# Patient Record
Sex: MALE | Race: WHITE | Employment: OTHER | URBAN - METROPOLITAN AREA
[De-identification: names, ages, dates, MRNs, and addresses within clinical notes are randomized per-mention and may not be internally consistent; named-entity substitution may affect disease eponyms.]

---

## 2017-02-20 ENCOUNTER — ONCOLOGY VISIT (OUTPATIENT)
Dept: ONCOLOGY | Facility: CLINIC | Age: 72
End: 2017-02-20
Payer: MEDICARE

## 2017-02-20 VITALS
DIASTOLIC BLOOD PRESSURE: 73 MMHG | TEMPERATURE: 97.3 F | SYSTOLIC BLOOD PRESSURE: 124 MMHG | HEIGHT: 73 IN | BODY MASS INDEX: 30.75 KG/M2 | WEIGHT: 232 LBS | RESPIRATION RATE: 20 BRPM | HEART RATE: 64 BPM | OXYGEN SATURATION: 96 %

## 2017-02-20 DIAGNOSIS — C01 MALIGNANT NEOPLASM OF BASE OF TONGUE (H): Primary | ICD-10-CM

## 2017-02-20 DIAGNOSIS — R63.5 ABNORMAL WEIGHT GAIN: ICD-10-CM

## 2017-02-20 DIAGNOSIS — C02.9 SQUAMOUS CELL CANCER OF TONGUE (H): ICD-10-CM

## 2017-02-20 DIAGNOSIS — E03.2 HYPOTHYROIDISM DUE TO NON-MEDICATION EXOGENOUS SUBSTANCES: ICD-10-CM

## 2017-02-20 DIAGNOSIS — T66.XXXS: ICD-10-CM

## 2017-02-20 LAB
ALBUMIN SERPL-MCNC: 3.8 G/DL (ref 3.4–5)
ALP SERPL-CCNC: 76 U/L (ref 40–150)
ALT SERPL W P-5'-P-CCNC: 32 U/L (ref 0–70)
ANION GAP SERPL CALCULATED.3IONS-SCNC: 7 MMOL/L (ref 3–14)
AST SERPL W P-5'-P-CCNC: 17 U/L (ref 0–45)
BASOPHILS # BLD AUTO: 0 10E9/L (ref 0–0.2)
BASOPHILS NFR BLD AUTO: 0.3 %
BILIRUB SERPL-MCNC: 1 MG/DL (ref 0.2–1.3)
BUN SERPL-MCNC: 18 MG/DL (ref 7–30)
CALCIUM SERPL-MCNC: 9.2 MG/DL (ref 8.5–10.1)
CHLORIDE SERPL-SCNC: 108 MMOL/L (ref 94–109)
CO2 SERPL-SCNC: 25 MMOL/L (ref 20–32)
CREAT SERPL-MCNC: 1.02 MG/DL (ref 0.66–1.25)
DIFFERENTIAL METHOD BLD: NORMAL
EOSINOPHIL # BLD AUTO: 0.1 10E9/L (ref 0–0.7)
EOSINOPHIL NFR BLD AUTO: 1.5 %
ERYTHROCYTE [DISTWIDTH] IN BLOOD BY AUTOMATED COUNT: 12.9 % (ref 10–15)
GFR SERPL CREATININE-BSD FRML MDRD: 72 ML/MIN/1.7M2
GLUCOSE SERPL-MCNC: 96 MG/DL (ref 70–99)
HCT VFR BLD AUTO: 43 % (ref 40–53)
HGB BLD-MCNC: 15.3 G/DL (ref 13.3–17.7)
LYMPHOCYTES # BLD AUTO: 1.5 10E9/L (ref 0.8–5.3)
LYMPHOCYTES NFR BLD AUTO: 19.6 %
MCH RBC QN AUTO: 31.2 PG (ref 26.5–33)
MCHC RBC AUTO-ENTMCNC: 35.6 G/DL (ref 31.5–36.5)
MCV RBC AUTO: 88 FL (ref 78–100)
MONOCYTES # BLD AUTO: 0.6 10E9/L (ref 0–1.3)
MONOCYTES NFR BLD AUTO: 8 %
NEUTROPHILS # BLD AUTO: 5.6 10E9/L (ref 1.6–8.3)
NEUTROPHILS NFR BLD AUTO: 70.6 %
PLATELET # BLD AUTO: 235 10E9/L (ref 150–450)
POTASSIUM SERPL-SCNC: 4.4 MMOL/L (ref 3.4–5.3)
PROT SERPL-MCNC: 7.1 G/DL (ref 6.8–8.8)
RBC # BLD AUTO: 4.9 10E12/L (ref 4.4–5.9)
SODIUM SERPL-SCNC: 140 MMOL/L (ref 133–144)
TSH SERPL DL<=0.05 MIU/L-ACNC: 2.58 MU/L (ref 0.4–4)
WBC # BLD AUTO: 7.9 10E9/L (ref 4–11)

## 2017-02-20 PROCEDURE — 99213 OFFICE O/P EST LOW 20 MIN: CPT | Performed by: INTERNAL MEDICINE

## 2017-02-20 PROCEDURE — 84443 ASSAY THYROID STIM HORMONE: CPT | Performed by: INTERNAL MEDICINE

## 2017-02-20 PROCEDURE — 36415 COLL VENOUS BLD VENIPUNCTURE: CPT | Performed by: INTERNAL MEDICINE

## 2017-02-20 PROCEDURE — 80053 COMPREHEN METABOLIC PANEL: CPT | Performed by: INTERNAL MEDICINE

## 2017-02-20 PROCEDURE — 85025 COMPLETE CBC W/AUTO DIFF WBC: CPT | Performed by: INTERNAL MEDICINE

## 2017-02-20 ASSESSMENT — PAIN SCALES - GENERAL: PAINLEVEL: NO PAIN (0)

## 2017-02-20 NOTE — MR AVS SNAPSHOT
After Visit Summary   2/20/2017    Paulie Mcdonough    MRN: [id number]           Patient Information     Date Of Birth          1945        Visit Information        Provider Department      2/20/2017 3:00 PM Jovi Mueller MD [facility]        Today's Diagnoses     MALIG NEOPLASM TONGUE BASE    -  1    Hypothyroidism due to non-medication exogenous substances           Follow-ups after your visit        Your next 10 appointments already scheduled     Apr 03, 2017 11:45 AM CDT   (Arrive by 11:30 AM)   RETURN TUMOR VISIT with Almas Jeff MD   [facility] ([facility])    [address]  4th Floor  [facility] [id number] [phone]            Jun 14, 2017  9:30 AM CDT   LAB with NL LAB [facility] ([facility])    [address] [place] [id number] [phone]           Patient must bring picture ID.  Patient should be prepared to give a urine specimen  Please do not eat 10-12 hours before your appointment if you are coming in fasting for labs on lipids, cholesterol, or glucose (sugar).  Pregnant women should follow their Care Team instructions. Water with medications is okay. Do not drink coffee or other fluids.   If you have concerns about taking  your medications, please ask at office or if scheduling via [employer], send a message by clicking on Secure Messaging, Message Your Care Team.            Aug 14, 2017 10:00 AM CDT   CT SOFT TISSUE NECK W CONTRAST with PHCT1   [facility] CT Scan ([facility])    [address] [place] [id number] [phone]           Please bring any scans or X-rays taken at other hospitals, if similar tests were done. Also bring a list of your medicines, including vitamins, minerals and over-the-counter drugs. It is safest to leave personal items at home.  Be sure to tell your doctor:   If you have any  allergies.   If there s any chance you are pregnant.   If you are breastfeeding.   If you have any special needs.  You will have contrast for this exam. To prepare:   Do not eat or drink for 2 hours before your exam. If you need to take medicine, you may take it with small sips of water. (We may ask you to take liquid medicine as well.)   The day before your exam, drink extra fluids at least six 8-ounce glasses (unless your doctor tells you to restrict your fluids).  Patients over 70 or patients with diabetes or kidney problems:   If you haven t had a blood test (creatinine test) within the last 30 days, go to your clinic or Diagnostic Imaging Department for this test.  If you have diabetes:   If your kidney function is normal, continue taking your metformin (Avandamet, Glucophage, Glucovance, Metaglip) on the day of your exam.   If your kidney function is abnormal, wait 48 hours before restarting this medicine.  Please wear loose clothing, such as a sweat suit or jogging clothes. Avoid snaps, zippers and other metal. We may ask you to undress and put on a hospital gown.  If you have any questions, please call the Imaging Department where you will have your exam.            Aug 14, 2017 10:30 AM CDT   CT CHEST W CONTRAST with PHCT1   [facility] CT Scan ([facility])    [address] [place] [id number] [phone]           Please bring any scans or X-rays taken at other hospitals, if similar tests were done. Also bring a list of your medicines, including vitamins, minerals and over-the-counter drugs. It is safest to leave personal items at home.  Be sure to tell your doctor:   If you have any allergies.   If there s any chance you are pregnant.   If you are breastfeeding.   If you have any special needs.  You will have contrast for this exam. To prepare:   Do not eat or drink for 2 hours before your exam. If you need to take medicine, you may take it with small sips of water.  (We may ask you to take liquid medicine as well.)   The day before your exam, drink extra fluids at least six 8-ounce glasses (unless your doctor tells you to restrict your fluids).  Patients over 70 or patients with diabetes or kidney problems:   If you haven t had a blood test (creatinine test) within the last 30 days, go to your clinic or Diagnostic Imaging Department for this test.  If you have diabetes:   If your kidney function is normal, continue taking your metformin (Avandamet, Glucophage, Glucovance, Metaglip) on the day of your exam.   If your kidney function is abnormal, wait 48 hours before restarting this medicine.  Please wear loose clothing, such as a sweat suit or jogging clothes. Avoid snaps, zippers and other metal. We may ask you to undress and put on a hospital gown.  If you have any questions, please call the Imaging Department where you will have your exam.            Aug 21, 2017  1:00 PM CDT   Return Visit with Jovi Mueller MD   [facility] ([facility])    [address] [place] [id number] [phone]              Future tests that were ordered for you today     Open Future Orders        Priority Expected Expires Ordered    CBC with platelets differential Routine 7/20/2017 2/20/2018 2/20/2017    Comprehensive metabolic panel Routine 7/20/2017 2/20/2018 2/20/2017    CT Chest w Contrast Routine 7/20/2017 2/20/2018 2/20/2017    TSH with free T4 reflex Routine 7/20/2017 2/20/2018 2/20/2017    CT Soft Tissue Neck w Contrast Routine 7/20/2017 2/20/2018 2/20/2017            Who to contact     If you have questions or need follow up information about today's clinic visit or your schedule please contact [facility] directly at [phone].  Normal or non-critical lab and imaging results will be communicated to you by MyChart, letter or phone within 4 business days after the clinic has received the results. If you do not hear  "from us within 7 days, please contact the clinic through [employer] or phone. If you have a critical or abnormal lab result, we will notify you by phone as soon as possible.  Submit refill requests through [employer] or call your pharmacy and they will forward the refill request to us. Please allow 3 business days for your refill to be completed.          Additional Information About Your Visit        [employer] Information     [employer] gives you secure access to your electronic health record. If you see a primary care provider, you can also send messages to your care team and make appointments. If you have questions, please call your primary care clinic.  If you do not have a primary care provider, please call [phone] and they will assist you.      [employer] is an electronic gateway that provides easy, online access to your medical records. With [employer], you can request a clinic appointment, read your test results, renew a prescription or communicate with your care team.     To access your existing account, please contact your [facility] or call [phone] for assistance.        Care EveryWhere ID     This is your Care EveryWhere ID. This could be used by other organizations to access your [facility] medical records  [id number]        Your Vitals Were     Pulse Temperature Respirations Height Pulse Oximetry BMI (Body Mass Index)    64 97.3  F (36.3  C) (Oral) 20 1.854 m (6' 0.99\") 96% 30.62 kg/m2       Blood Pressure from Last 3 Encounters:   02/20/17 124/73   08/15/16 150/84   02/08/16 140/85    Weight from Last 3 Encounters:   02/20/17 105.2 kg (232 lb)   08/15/16 106.6 kg (235 lb)   03/14/16 107.2 kg (236 lb 4.8 oz)               Primary Care Provider    [facility]       No address on file        Thank you!     Thank you for choosing [facility]  for your care. Our goal is always to provide you with excellent care. Hearing back from our patients " is one way we can continue to improve our services. Please take a few minutes to complete the written survey that you may receive in the mail after your visit with us. Thank you!             Your Updated Medication List - Protect others around you: Learn how to safely use, store and throw away your medicines at www.disposemymeds.org.          This list is accurate as of: 2/20/17  4:13 PM.  Always use your most recent med list.                   Brand Name Dispense Instructions for use    DOXAZOSIN MESYLATE PO      Take 2 mg by mouth daily       finasteride 5 MG tablet    PROSCAR     Take 5 mg by mouth       FLUZONE HIGH-DOSE 0.5 ML injection   Generic drug:  influenza Vac Split High-Dose          levothyroxine 50 MCG tablet    SYNTHROID/LEVOTHROID    90 tablet    Take 1 tablet (50 mcg) by mouth daily       loratadine 10 MG tablet    CLARITIN     Take 10 mg by mouth daily       OMEPRAZOLE PO      Take 20 mg by mouth daily

## 2017-02-20 NOTE — PROGRESS NOTES
[facility] CANCER CLINIC  FOLLOW-UP VISIT NOTE  Date of visit: Feb 20, 2017       Reason for Visit: follow-up SCC BOT, completed concurrent chemoradiation therapy with HD cisplatin 8/2013    Chemotherapy 6/24/2013 7/15/2013 8/5/2013   Day, Cycle Day 1, Cycle 1 Day 22, Cycle 1 Day 43, Cycle 1   CISplatin (PLATINOL)  mg 230 mg 230 mg     HPI: Mr. 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 He began having a sore throat, increasing expectoration, and eventually started having blood in his sputum. He started having coughing spells, and had a particularly bad episode on Memorial Day weekend. He presented to the Emergency Department where he was evaluated with imaging and was notified that he might have cancer. He was referred to an ear, nose and throat specialist locally in [place]. His ENT surgeon in [place] did confirm that he did have cancer, and referred him to the [facility] ER where he was seen by Dr. Hernandez on 06/06. At this visit, he did express shortness of breath, which was progressively getting worse, and difficulty managing his secretions, especially during the night. He used to keep the head end of the bed elevated and had some noisy breathing. Dr. Hernandez did perform a flexible fiberoptic direct laryngoscopy where a large mass occupying greater than 50% of the oropharyngeal airway was noted, which seemed to be arising from the base of the tongue. It did fill the vallecula and extended along the lateral pharyngeal wall into the hypopharynx and supraglottis. Biopsy of right tongue base revealed poorly differentiated squamous cell carcinoma. The mass seemed to be obstructing the airway, and, although the patient was not in any immediate airway distress, it was recommended that he undergo tracheostomy to secure the airway. He was admitted for this on 06/07.. Thoracic Surgery  was consulted to get a port and a PEG placed.     PET/CT done 6/10/13 showed no lymphadenopathy in neck and no metastatic disease.  He presented to the ED 6/13/13 with severe coughing episode and hematemesis, as well as coffee-ground drainage from PET tube. He had an embolization to the tumor done at [facility] 6/14. He was seen in Medical Oncology on 6/14/13 with plan to proceed with definitive concurrent chemoradiation with cisplatin every 3 weeks and daily radiation with a curative intent. He presented again to the ED 6/18/13 with hematemesis for over 2 hours and melena and admitted briefly overnight on observation. ENT concerned to do another embolization or surgery due to tumor friability and if patient required neck dissection in future. Discharged 6/19/13 with hopes that starting treatment will control the bleeding. He started HD cisplatin and daily radiation 6/24/13. He did exceptionally well throughout concurrent therapy until the last few days of XRT he was admitted due to throat pain, managing secretions and difficulty with PEG tolerability.    Interval History:   Mr. Mcdonough is doing well.     Paulie recently had a cold. But otherwise he has been doing well. He has been able to eat and drink without difficulty. He has no pain. He has significant hearing loss and has hearing aid in his right ear. He still seemed to have a hard time hearing me. He notes that during his days - concept of ear protection was absent.    ROS:  No other f/s/c. No chest pain/dyspnea.  Daily BM, no black/bloody stools. No urinary issues. No neuropathy.  Mood is good.    Current Outpatient Prescriptions   Medication     DOXAZOSIN MESYLATE PO     finasteride (PROSCAR) 5 MG tablet     FLUZONE HIGH-DOSE 0.5 ML injection     levothyroxine (SYNTHROID, LEVOTHROID) 50 MCG tablet     OMEPRAZOLE PO     loratadine (CLARITIN) 10 MG tablet     No current facility-administered medications for this visit.        PHYSICAL EXAM:  /73 (BP Location:  "Left arm, Patient Position: Chair, Cuff Size: Adult Large)  Pulse 64  Temp 97.3  F (36.3  C) (Oral)  Resp 20  Ht 1.854 m (6' 0.99\")  Wt 105.2 kg (232 lb)  SpO2 96%  BMI 30.62 kg/m2  Wt Readings from Last 4 Encounters:   02/20/17 105.2 kg (232 lb)   08/15/16 106.6 kg (235 lb)   03/14/16 107.2 kg (236 lb 4.8 oz)   02/08/16 107.4 kg (236 lb 11.2 oz)       General: Alert, oriented, pleasant, NAD.   Skin: No rashes on exposed skin  HEENT: Normocephalic, atraumatic, PERRLA, EOMI. No mucositis, exposed bone or thrush  Neck: No cervical or supraclavicular LAD.   Lungs: CTA bilaterally, normal work of breathing  Cardiac: RRR, S1, S2, no murmurs  Abdomen: Soft, nontender, nondistended. Normoactive bowel sounds. No hepatosplenomegaly.  Neuro: CNII-XII grossly intact    Labs:   Recent Labs   Lab Test  02/20/17   1323  08/08/16   0838  02/08/16   0947  08/07/15   0912  04/13/15   1301   NA  140  140  142  139  142   POTASSIUM  4.4  4.0  4.2  4.3  4.0   CHLORIDE  108  108  109  107  108   CO2  25  27  27  27  25   ANIONGAP  7  5  6  5  8   BUN  18  15  18  16  17   CR  1.02  0.97  0.90  0.91  0.97   GLC  96  101*  101*  96  88   TINO  9.2  8.6  8.3*  9.1  9.1     Recent Labs   Lab Test  09/26/13   0755  08/29/13   1113  08/22/13   0940  08/20/13   0634  08/19/13   0733  08/16/13   0608  08/15/13   0749   08/14/13   0546   MAG  2.1  2.1  1.7  1.8  1.6  1.7  1.8   < >  1.5*   PHOS   --    --   3.3   --   3.0  3.2  2.7   --   2.8    < > = values in this interval not displayed.     Recent Labs   Lab Test  02/20/17   1323  08/08/16   0838  02/08/16   0947  08/07/15   0912  04/13/15   1301   WBC  7.9  6.9  6.2  6.0  7.3   HGB  15.3  15.9  15.0  15.1  14.6   PLT  235  216  199  223  243   MCV  88  89  89  91  91   NEUTROPHIL  70.6  67.1  75.0  67.7  69.1     Recent Labs   Lab Test  02/20/17   1323  08/08/16   0838  02/08/16   0947   04/13/15   1301  02/16/15   0845   BILITOTAL  1.0  1.2  0.8   < >  1.0  0.9   ALKPHOS  76  87  79   < >  " 91  88   ALT  32  39  30   < >  32  36   AST  17  27  22   < >  18  20   ALBUMIN  3.8  3.8  3.9   < >  3.9  3.5   LDH   --    --   190   --   172  170    < > = values in this interval not displayed.     TSH   Date Value Ref Range Status   02/20/2017 2.58 0.40 - 4.00 mU/L Final   08/08/2016 3.85 0.40 - 4.00 mU/L Final   02/08/2016 3.27 0.40 - 4.00 mU/L Final       Results for orders placed or performed in visit on 08/08/16   CT Soft Tissue Neck w Contrast    Narrative    CT SOFT TISSUE NECK W CONTRAST 8/8/2016 10:00 AM    History:  T4N0 SCC of right base of tongue status post completion of  chemoradiation 8/2013.      Comparison:  CT soft tissue neck on 8/7/2015    Technique: Following intravenous administration of nonionic iodinated  contrast medium, thin section helical CT images were obtained from the  skull base down to the level of the aortic arch.  Axial, coronal and  sagittal reformations were performed with 2-3 mm slice thickness  reconstruction. Images were reviewed in soft tissue, lung and bone  windows.    Contrast: isovue 370 116ml    Findings: Streak artifacts limits evaluation of the tongue, however no  clear evidence of tumor recurrence at the base of the tongue.    No cervical mass or lymphadenopathy to suggest recurrent or metastatic  squamous cell carcinoma. Postradiation changes with mild platysmal  thickening, stranding of the subcutaneous fat, and atrophy of the  submandibular glands. Stable mild asymmetric atrophy of the tongue  musculature.    Evaluation of the mucosal space demonstrates no evident abnormality in  the nasopharynx, oropharynx, hypopharynx or the glottis. The tongue  base appears normal. Stable tiny hypodense 4 mm right thyroid lobe  nodule.    The major vascular structures in the neck appear unremarkable.    Evaluation of the osseous structures demonstrate no worrisome lytic or  sclerotic lesion. Periapical lucency about the right and the left  second molar and right lateral  incisor, suggestive of odontogenic  infection. Multilevel degenerative cervical spondylosis with posterior  disc osteophyte complex mild spinal canal stenosis C5-C6. Moderate  right and mild left neural foraminal stenosis at C5-C6. Chronic T1  spinous process fracture. Ligamentum nuchae calcifications. Bilateral  maxillary sinus mucus retention cysts. The mastoid air cells are  clear.     The visualized lung apices are clear.      Impression    Impression:  No evidence of recurrent or metastatic squamous cell carcinoma.  No  evident mass or adenopathy within the neck.     I have personally reviewed the examination and initial interpretation  and I agree with the findings.    GUILLERMINA BURK MD       Assessment & Plan:   1. 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 ECOG PS 0  3. NO significant medical comorbidities  4. restaging CT scan do not show evidence of disease  5. Hearing loss on chemoradiation  6. HTN and weight gain; fatigue and shortness of breath    We had a lengthy discussion with Paulie about his recovery and now is over 3.5 years from completion of therapy and clinically there is no evidence of recurrence. Reassured that there is no evidence of cancer recurrence by today's labs or physical exam. He had not been keeping up with his follow up in ENT.  He did meet Dr. Almas Jeff about 6 months ago and is again due for his next follow-up but has nothing scheduled.  I encouraged him to keep his apponitment for continued surveillance.     He is euthyroid.     We will follow up in about 6 months with labs and restaging scans.

## 2017-02-20 NOTE — NURSING NOTE
"Paulie Mcdonough is a 71 year old male who presents for:  Chief Complaint   Patient presents with     Oncology Clinic Visit     6 mo f/u        Initial Vitals:  /73 (BP Location: Left arm, Patient Position: Chair, Cuff Size: Adult Large)  Pulse 64  Temp 97.3  F (36.3  C) (Oral)  Resp 20  Ht 1.854 m (6' 0.99\")  Wt 105.2 kg (232 lb)  SpO2 96%  BMI 30.62 kg/m2 Estimated body mass index is 30.62 kg/(m^2) as calculated from the following:    Height as of this encounter: 1.854 m (6' 0.99\").    Weight as of this encounter: 105.2 kg (232 lb).. Body surface area is 2.33 meters squared. BP completed using cuff size: regular  No Pain (0) No LMP for male patient. Allergies and medications reviewed.     Medications: Medication refills not needed today.  Pharmacy name entered into [employer]:    [id number] - [place], [place] - [address]  [address]  [facility] - [place], [place] - [address]  [id number] - [place], [place] - [address] [place] [address]    Comments:     8 minutes for nursing intake (face to face time)   ALIYA SARABIA LPN        "

## 2017-04-03 ENCOUNTER — OFFICE VISIT (OUTPATIENT)
Dept: OTOLARYNGOLOGY | Facility: CLINIC | Age: 72
End: 2017-04-03

## 2017-04-03 ENCOUNTER — OFFICE VISIT (OUTPATIENT)
Dept: AUDIOLOGY | Facility: CLINIC | Age: 72
End: 2017-04-03

## 2017-04-03 VITALS — BODY MASS INDEX: 31.8 KG/M2 | WEIGHT: 241 LBS

## 2017-04-03 DIAGNOSIS — H90.5 SENSORINEURAL HEARING LOSS: Primary | ICD-10-CM

## 2017-04-03 DIAGNOSIS — C01 MALIGNANT NEOPLASM OF BASE OF TONGUE (H): Primary | ICD-10-CM

## 2017-04-03 ASSESSMENT — PAIN SCALES - GENERAL: PAINLEVEL: NO PAIN (0)

## 2017-04-03 NOTE — PROGRESS NOTES
HISTORY OF PRESENT ILLNESS:  Mr. Mcdonough is seen in place of Dr. Hernandez.  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  He is now three years and eight months out.  He did have some fatigue recently that was diagnosed as Lyme disease.  Once he got treated, he says he felt much better.  He denies any otalgia, odynophagia or dysphagia.      PHYSICAL EXAMINATION:  He is well-appearing, in no distress.  Weight is 241 pounds.  Examination of the oral cavity reveals normal mucosa of the tongue, floor of mouth, hard and soft palate, gingival and buccal mucosa, retromolar trigone and posterior pharyngeal wall.  Palpation of floor of mouth, tongue and base of tongue are negative.  He cannot tolerate mirror exam.  Examination of the neck reveals no mass or lymphadenopathy.      IMPRESSION:  No evidence of disease.      PLAN:  I will see the patient back in six months for oncologic surveillance.      cc:   Summer Hernandez MD          [place] [address]                    Samuel Coffey MD          [address]   [address]

## 2017-04-03 NOTE — MR AVS SNAPSHOT
After Visit Summary   4/3/2017    Paulie Mcdonough    MRN: [id number]           Patient Information     Date Of Birth          1945        Visit Information        Provider Department      4/3/2017 11:45 AM Almas Jeff MD [facility] Ear Nose and Throat        Today's Diagnoses     MALIG NEOPLASM TONGUE BASE    -  1       Follow-ups after your visit        Your next 10 appointments already scheduled     Jun 14, 2017  9:30 AM CDT   LAB with NL LAB PMC   [facility]    [address] [place] [address] [phone]           Patient must bring picture ID.  Patient should be prepared to give a urine specimen  Please do not eat 10-12 hours before your appointment if you are coming in fasting for labs on lipids, cholesterol, or glucose (sugar).  Pregnant women should follow their Care Team instructions. Water with medications is okay. Do not drink coffee or other fluids.   If you have concerns about taking  your medications, please ask at office or if scheduling via [employer], send a message by clicking on Secure Messaging, Message Your Care Team.            Aug 14, 2017 10:00 AM CDT   CT SOFT TISSUE NECK W CONTRAST with PHCT1   [facility] CT Scan ([facility])    [address] [place] [phone]           Please bring any scans or X-rays taken at other hospitals, if similar tests were done. Also bring a list of your medicines, including vitamins, minerals and over-the-counter drugs. It is safest to leave personal items at home.  Be sure to tell your doctor:   If you have any allergies.   If there s any chance you are pregnant.   If you are breastfeeding.   If you have any special needs.  You will have contrast for this exam. To prepare:   Do not eat or drink for 2 hours before your exam. If you need to take medicine, you may take it with small sips of water. (We may ask you to take liquid  medicine as well.)   The day before your exam, drink extra fluids at least six 8-ounce glasses (unless your doctor tells you to restrict your fluids).  Patients over 70 or patients with diabetes or kidney problems:   If you haven t had a blood test (creatinine test) within the last 30 days, go to your clinic or Diagnostic Imaging Department for this test.  If you have diabetes:   If your kidney function is normal, continue taking your metformin (Avandamet, Glucophage, Glucovance, Metaglip) on the day of your exam.   If your kidney function is abnormal, wait 48 hours before restarting this medicine.  Please wear loose clothing, such as a sweat suit or jogging clothes. Avoid snaps, zippers and other metal. We may ask you to undress and put on a hospital gown.  If you have any questions, please call the Imaging Department where you will have your exam.            Aug 14, 2017 10:30 AM CDT   CT CHEST W CONTRAST with PHCT1   [facility] CT Scan ([facility])    [address] [place] [id number] [phone]           Please bring any scans or X-rays taken at other hospitals, if similar tests were done. Also bring a list of your medicines, including vitamins, minerals and over-the-counter drugs. It is safest to leave personal items at home.  Be sure to tell your doctor:   If you have any allergies.   If there s any chance you are pregnant.   If you are breastfeeding.   If you have any special needs.  You will have contrast for this exam. To prepare:   Do not eat or drink for 2 hours before your exam. If you need to take medicine, you may take it with small sips of water. (We may ask you to take liquid medicine as well.)   The day before your exam, drink extra fluids at least six 8-ounce glasses (unless your doctor tells you to restrict your fluids).  Patients over 70 or patients with diabetes or kidney problems:   If you haven t had a blood test (creatinine test) within the last 30  days, go to your clinic or Diagnostic Imaging Department for this test.  If you have diabetes:   If your kidney function is normal, continue taking your metformin (Avandamet, Glucophage, Glucovance, Metaglip) on the day of your exam.   If your kidney function is abnormal, wait 48 hours before restarting this medicine.  Please wear loose clothing, such as a sweat suit or jogging clothes. Avoid snaps, zippers and other metal. We may ask you to undress and put on a hospital gown.  If you have any questions, please call the Imaging Department where you will have your exam.            Aug 21, 2017  1:00 PM CDT   Return Visit with Jovi Mueller MD   [facility] ([facility])    [address] [place] [id number] [phone]              Who to contact     Please call your clinic at [phone] to:    Ask questions about your health    Make or cancel appointments    Discuss your medicines    Learn about your test results    Speak to your doctor   If you have compliments or concerns about an experience at your clinic, or if you wish to file a complaint, please contact [facility] Patient Relations at [phone] or email us at [email or web address]         Additional Information About Your Visit        [email or web address] Information     [email or web address] gives you secure access to your electronic health record. If you see a primary care provider, you can also send messages to your care team and make appointments. If you have questions, please call your primary care clinic.  If you do not have a primary care provider, please call [phone] and they will assist you.      [email or web address] is an electronic gateway that provides easy, online access to your medical records. With [email or web address], you can request a clinic appointment, read your test results, renew a prescription or communicate with your care team.     To access your existing account, please contact your  [facility] or call [phone] for assistance.        Care EveryWhere ID     This is your Care EveryWhere ID. This could be used by other organizations to access your [place] medical records  [id number]        Your Vitals Were     BMI (Body Mass Index)                   31.8 kg/m2            Blood Pressure from Last 3 Encounters:   02/20/17 124/73   08/15/16 150/84   02/08/16 140/85    Weight from Last 3 Encounters:   04/03/17 109.3 kg (241 lb)   02/20/17 105.2 kg (232 lb)   08/15/16 106.6 kg (235 lb)              Today, you had the following     No orders found for display       Primary Care Provider    [facility]       No address on file        Thank you!     Thank you for choosing [facility] EAR NOSE AND THROAT  for your care. Our goal is always to provide you with excellent care. Hearing back from our patients is one way we can continue to improve our services. Please take a few minutes to complete the written survey that you may receive in the mail after your visit with us. Thank you!             Your Updated Medication List - Protect others around you: Learn how to safely use, store and throw away your medicines at www.disposemymeds.org.          This list is accurate as of: 4/3/17 11:59 PM.  Always use your most recent med list.                   Brand Name Dispense Instructions for use    DOXAZOSIN MESYLATE PO      Take 2 mg by mouth daily       finasteride 5 MG tablet    PROSCAR     Take 5 mg by mouth Reported on 4/3/2017       FLUZONE HIGH-DOSE 0.5 ML injection   Generic drug:  influenza Vac Split High-Dose      Reported on 4/3/2017       levothyroxine 50 MCG tablet    SYNTHROID/LEVOTHROID    90 tablet    Take 1 tablet (50 mcg) by mouth daily       loratadine 10 MG tablet    CLARITIN     Take 10 mg by mouth daily Reported on 4/3/2017       OMEPRAZOLE PO      Take 20 mg by mouth daily Reported on 4/3/2017

## 2017-04-03 NOTE — MR AVS SNAPSHOT
After Visit Summary   4/3/2017    Paulie Mcdonough    MRN: [id number]           Patient Information     Date Of Birth          1945        Visit Information        Provider Department      4/3/2017 11:00 AM Alicia Fernandez AuD M [facility] Audiology        Today's Diagnoses     Sensorineural hearing loss    -  1       Follow-ups after your visit        Your next 10 appointments already scheduled     Jun 14, 2017  9:30 AM CDT   LAB with NL LAB PMC   [facility] ([facility])    [address] [place] [phone]           Patient must bring picture ID.  Patient should be prepared to give a urine specimen  Please do not eat 10-12 hours before your appointment if you are coming in fasting for labs on lipids, cholesterol, or glucose (sugar).  Pregnant women should follow their Care Team instructions. Water with medications is okay. Do not drink coffee or other fluids.   If you have concerns about taking  your medications, please ask at office or if scheduling via [employer], send a message by clicking on Secure Messaging, Message Your Care Team.            Aug 14, 2017 10:00 AM CDT   CT SOFT TISSUE NECK W CONTRAST with PHCT1   [facility] CT Scan ([facility])    [address] [place] [phone]           Please bring any scans or X-rays taken at other hospitals, if similar tests were done. Also bring a list of your medicines, including vitamins, minerals and over-the-counter drugs. It is safest to leave personal items at home.  Be sure to tell your doctor:   If you have any allergies.   If there s any chance you are pregnant.   If you are breastfeeding.   If you have any special needs.  You will have contrast for this exam. To prepare:   Do not eat or drink for 2 hours before your exam. If you need to take medicine, you may take it with small sips of water. (We may ask you to take liquid medicine as  well.)   The day before your exam, drink extra fluids at least six 8-ounce glasses (unless your doctor tells you to restrict your fluids).  Patients over 70 or patients with diabetes or kidney problems:   If you haven t had a blood test (creatinine test) within the last 30 days, go to your clinic or Diagnostic Imaging Department for this test.  If you have diabetes:   If your kidney function is normal, continue taking your metformin (Avandamet, Glucophage, Glucovance, Metaglip) on the day of your exam.   If your kidney function is abnormal, wait 48 hours before restarting this medicine.  Please wear loose clothing, such as a sweat suit or jogging clothes. Avoid snaps, zippers and other metal. We may ask you to undress and put on a hospital gown.  If you have any questions, please call the Imaging Department where you will have your exam.            Aug 14, 2017 10:30 AM CDT   CT CHEST W CONTRAST with PHCT1   [facility] CT Scan ([facility])    [address] [place] [phone]           Please bring any scans or X-rays taken at other hospitals, if similar tests were done. Also bring a list of your medicines, including vitamins, minerals and over-the-counter drugs. It is safest to leave personal items at home.  Be sure to tell your doctor:   If you have any allergies.   If there s any chance you are pregnant.   If you are breastfeeding.   If you have any special needs.  You will have contrast for this exam. To prepare:   Do not eat or drink for 2 hours before your exam. If you need to take medicine, you may take it with small sips of water. (We may ask you to take liquid medicine as well.)   The day before your exam, drink extra fluids at least six 8-ounce glasses (unless your doctor tells you to restrict your fluids).  Patients over 70 or patients with diabetes or kidney problems:   If you haven t had a blood test (creatinine test) within the last 30 days, go to your  clinic or Diagnostic Imaging Department for this test.  If you have diabetes:   If your kidney function is normal, continue taking your metformin (Avandamet, Glucophage, Glucovance, Metaglip) on the day of your exam.   If your kidney function is abnormal, wait 48 hours before restarting this medicine.  Please wear loose clothing, such as a sweat suit or jogging clothes. Avoid snaps, zippers and other metal. We may ask you to undress and put on a hospital gown.  If you have any questions, please call the Imaging Department where you will have your exam.            Aug 21, 2017  1:00 PM CDT   Return Visit with Jovi Mueller MD   [facility] ([facility])    [address] [place] [phone]              Who to contact     Please call your clinic at [phone] to:    Ask questions about your health    Make or cancel appointments    Discuss your medicines    Learn about your test results    Speak to your doctor   If you have compliments or concerns about an experience at your clinic, or if you wish to file a complaint, please contact [facility] Patient Relations at [phone] or email us at [email or web address]         Additional Information About Your Visit        I[employer] Information     idio gives you secure access to your electronic health record. If you see a primary care provider, you can also send messages to your care team and make appointments. If you have questions, please call your primary care clinic.  If you do not have a primary care provider, please call [phone] and they will assist you.      idio is an electronic gateway that provides easy, online access to your medical records. With idio, you can request a clinic appointment, read your test results, renew a prescription or communicate with your care team.     To access your existing account, please contact your [facility]  [place] [facility] or call [phone] for assistance.        Care EveryWhere ID     This is your Care EveryWhere ID. This could be used by other organizations to access your [place] medical records  [id number]         Blood Pressure from Last 3 Encounters:   02/20/17 124/73   08/15/16 150/84   02/08/16 140/85    Weight from Last 3 Encounters:   04/03/17 109.3 kg (241 lb)   02/20/17 105.2 kg (232 lb)   08/15/16 106.6 kg (235 lb)              We Performed the Following     No Charge, Hearing Aid Clinic Visit        Primary Care Provider    [facility]       No address on file        Thank you!     Thank you for choosing [facility] AUDIOLOGY  for your care. Our goal is always to provide you with excellent care. Hearing back from our patients is one way we can continue to improve our services. Please take a few minutes to complete the written survey that you may receive in the mail after your visit with us. Thank you!             Your Updated Medication List - Protect others around you: Learn how to safely use, store and throw away your medicines at www.disposemymeds.org.          This list is accurate as of: 4/3/17 11:59 PM.  Always use your most recent med list.                   Brand Name Dispense Instructions for use    DOXAZOSIN MESYLATE PO      Take 2 mg by mouth daily       finasteride 5 MG tablet    PROSCAR     Take 5 mg by mouth Reported on 4/3/2017       FLUZONE HIGH-DOSE 0.5 ML injection   Generic drug:  influenza Vac Split High-Dose      Reported on 4/3/2017       levothyroxine 50 MCG tablet    SYNTHROID/LEVOTHROID    90 tablet    Take 1 tablet (50 mcg) by mouth daily       loratadine 10 MG tablet    CLARITIN     Take 10 mg by mouth daily Reported on 4/3/2017       OMEPRAZOLE PO      Take 20 mg by mouth daily Reported on 4/3/2017

## 2017-04-03 NOTE — LETTER
4/3/2017       RE: Paulie Mcdonough  [address]   [facility] [address]     Dear Colleague,    Thank you for referring your patient, Paulie Mcdonough, to the [facility] EAR NOSE AND THROAT at [facility]. Please see a copy of my visit note below.    HISTORY OF PRESENT ILLNESS:  Mr. Mcdonough is seen in place of Dr. Hernandez.  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  He is now three years and eight months out.  He did have some fatigue recently that was diagnosed as Lyme disease.  Once he got treated, he says he felt much better.  He denies any otalgia, odynophagia or dysphagia.      PHYSICAL EXAMINATION:  He is well-appearing, in no distress.  Weight is 241 pounds.  Examination of the oral cavity reveals normal mucosa of the tongue, floor of mouth, hard and soft palate, gingival and buccal mucosa, retromolar trigone and posterior pharyngeal wall.  Palpation of floor of mouth, tongue and base of tongue are negative.  He cannot tolerate mirror exam.  Examination of the neck reveals no mass or lymphadenopathy.      IMPRESSION:  No evidence of disease.      PLAN:  I will see the patient back in six months for oncologic surveillance.     Again, thank you for allowing me to participate in the care of your patient.      Sincerely,    Almas Jeff MD       cc:   Summer Hernandez MD          [place] [address]                    Samuel Coffey MD          [address]   [address]

## 2017-04-03 NOTE — NURSING NOTE
Chief Complaint   Patient presents with     RECHECK     Return F/U 6 month     Pt states no pain today.    N Allan FANG

## 2017-04-05 NOTE — PROGRESS NOTES
AUDIOLOGY REPORT    BACKGROUND INFORMATION: Paulie Mcdonough, 71 year old male, was seen in Audiology at the [facility] [place] on 4/3/2017 for follow-up.  The patient has been seen previously in this clinic in 2014 and results revealed mild to profound sensorineural hearing loss bilaterally for which the patient was fit with a long term loaner hearing aid on 9/29/2014.     Today the patient reports that his left hearing aid is no longer functioning. He reports that he has been unable to work for the past several days because his hearing aid is not functioning.     TEST RESULTS AND PROCEDURES: A listening check revealed that the hearing aid is not functioning. The start up indication can still be heard, which leads me to believe that the microphones have gone bad. I reviewed with the patient that the hearing aid will need to go in for repair, and that this will be his responsibility to cover. He expressed understanding. The patient was fit with another loaner hearing aid today.    Loaner agreements were filled out today. The patient will borrow the current hearing aid until his long term loaner is back from repair. We reviewed that the patient does need to purchase his own right sided hearing aid, as he has been borrowing one from the clinic for over 2 years now. I reviewed information on costs associated with purchasing hearing aids. I also printed an [employer] application for the patient.     SUMMARY AND RECOMMENDATIONS: The patient was seen today due to a broken long term loaner hearing aid. The hearing aid will be sent in for repair, and the patient will be responsible for the cost of repair. He will be called to  the hearing aid when it returns, and he will then return his new loaner hearing aid. Please call with questions regarding today's appointment.     Oleg Velez.  Licensed Audiologist  [place] #[id number]

## 2017-04-28 ENCOUNTER — OFFICE VISIT (OUTPATIENT)
Dept: AUDIOLOGY | Facility: CLINIC | Age: 72
End: 2017-04-28

## 2017-04-28 DIAGNOSIS — H90.3 SENSORINEURAL HEARING LOSS, BILATERAL: Primary | ICD-10-CM

## 2017-04-28 NOTE — MR AVS SNAPSHOT
After Visit Summary   4/28/2017    Paulie Mcdonough    MRN: [id number]           Patient Information     Date Of Birth          1945        Visit Information        Provider Department      4/28/2017 9:00 AM Rupinder Herbert [facility]        Today's Diagnoses     Sensorineural hearing loss, bilateral    -  1       Follow-ups after your visit        Your next 10 appointments already scheduled     Jun 14, 2017  9:30 AM CDT   LAB with NL LAB PMC   [facility]    [address] [place] [id number] [phone]           Patient must bring picture ID.  Patient should be prepared to give a urine specimen  Please do not eat 10-12 hours before your appointment if you are coming in fasting for labs on lipids, cholesterol, or glucose (sugar).  Pregnant women should follow their Care Team instructions. Water with medications is okay. Do not drink coffee or other fluids.   If you have concerns about taking  your medications, please ask at office or if scheduling via [facility], send a message by clicking on Secure Messaging, Message Your Care Team.            Aug 14, 2017 10:00 AM CDT   CT SOFT TISSUE NECK W CONTRAST with PHCT1   [facility] CT Scan ([facility])    [address] [place] [id number] [phone]           Please bring any scans or X-rays taken at other hospitals, if similar tests were done. Also bring a list of your medicines, including vitamins, minerals and over-the-counter drugs. It is safest to leave personal items at home.  Be sure to tell your doctor:   If you have any allergies.   If there s any chance you are pregnant.   If you are breastfeeding.   If you have any special needs.  You will have contrast for this exam. To prepare:   Do not eat or drink for 2 hours before your exam. If you need to take medicine, you may take it with small sips of water. (We may ask you to take liquid  medicine as well.)   The day before your exam, drink extra fluids at least six 8-ounce glasses (unless your doctor tells you to restrict your fluids).  Patients over 70 or patients with diabetes or kidney problems:   If you haven t had a blood test (creatinine test) within the last 30 days, go to your clinic or Diagnostic Imaging Department for this test.  If you have diabetes:   If your kidney function is normal, continue taking your metformin (Avandamet, Glucophage, Glucovance, Metaglip) on the day of your exam.   If your kidney function is abnormal, wait 48 hours before restarting this medicine.  Please wear loose clothing, such as a sweat suit or jogging clothes. Avoid snaps, zippers and other metal. We may ask you to undress and put on a hospital gown.  If you have any questions, please call the Imaging Department where you will have your exam.            Aug 14, 2017 10:30 AM CDT   CT CHEST W CONTRAST with PHCT1   [facility] CT Scan ([facility])    [address] [place] [id number] [phone]           Please bring any scans or X-rays taken at other hospitals, if similar tests were done. Also bring a list of your medicines, including vitamins, minerals and over-the-counter drugs. It is safest to leave personal items at home.  Be sure to tell your doctor:   If you have any allergies.   If there s any chance you are pregnant.   If you are breastfeeding.   If you have any special needs.  You will have contrast for this exam. To prepare:   Do not eat or drink for 2 hours before your exam. If you need to take medicine, you may take it with small sips of water. (We may ask you to take liquid medicine as well.)   The day before your exam, drink extra fluids at least six 8-ounce glasses (unless your doctor tells you to restrict your fluids).  Patients over 70 or patients with diabetes or kidney problems:   If you haven t had a blood test (creatinine test) within the last 30  days, go to your clinic or Diagnostic Imaging Department for this test.  If you have diabetes:   If your kidney function is normal, continue taking your metformin (Avandamet, Glucophage, Glucovance, Metaglip) on the day of your exam.   If your kidney function is abnormal, wait 48 hours before restarting this medicine.  Please wear loose clothing, such as a sweat suit or jogging clothes. Avoid snaps, zippers and other metal. We may ask you to undress and put on a hospital gown.  If you have any questions, please call the Imaging Department where you will have your exam.            Aug 21, 2017  1:00 PM CDT   Return Visit with Jovi Mueller MD   [facility] ([facility])    [address] [place] [id number] [phone]              Who to contact     Please call your clinic at [phone] to:    Ask questions about your health    Make or cancel appointments    Discuss your medicines    Learn about your test results    Speak to your doctor   If you have compliments or concerns about an experience at your clinic, or if you wish to file a complaint, please contact [facility] Patient Relations at [phone] or email us at [email or web address]         Additional Information About Your Visit        [employer] Information     [employer] gives you secure access to your electronic health record. If you see a primary care provider, you can also send messages to your care team and make appointments. If you have questions, please call your primary care clinic.  If you do not have a primary care provider, please call [phone] and they will assist you.      [employer] is an electronic gateway that provides easy, online access to your medical records. With [employer], you can request a clinic appointment, read your test results, renew a prescription or communicate with your care team.     To access your existing account, please contact your  [facility] or call [phone] for assistance.        Care EveryWhere ID     This is your Care EveryWhere ID. This could be used by other organizations to access your [place] medical records  [id number]         Blood Pressure from Last 3 Encounters:   02/20/17 124/73   08/15/16 150/84   02/08/16 140/85    Weight from Last 3 Encounters:   04/03/17 109.3 kg (241 lb)   02/20/17 105.2 kg (232 lb)   08/15/16 106.6 kg (235 lb)              We Performed the Following     Hearing Aid Repair        Primary Care Provider    [facility]       No address on file        Thank you!     Thank you for choosing [facility]  for your care. Our goal is always to provide you with excellent care. Hearing back from our patients is one way we can continue to improve our services. Please take a few minutes to complete the written survey that you may receive in the mail after your visit with us. Thank you!             Your Updated Medication List - Protect others around you: Learn how to safely use, store and throw away your medicines at www.disposemymeds.org.          This list is accurate as of: 4/28/17  2:40 PM.  Always use your most recent med list.                   Brand Name Dispense Instructions for use    DOXAZOSIN MESYLATE PO      Take 2 mg by mouth daily       finasteride 5 MG tablet    PROSCAR     Take 5 mg by mouth Reported on 4/3/2017       FLUZONE HIGH-DOSE 0.5 ML injection   Generic drug:  influenza Vac Split High-Dose      Reported on 4/3/2017       levothyroxine 50 MCG tablet    SYNTHROID/LEVOTHROID    90 tablet    Take 1 tablet (50 mcg) by mouth daily       loratadine 10 MG tablet    CLARITIN     Take 10 mg by mouth daily Reported on 4/3/2017       OMEPRAZOLE PO      Take 20 mg by mouth daily Reported on 4/3/2017

## 2017-05-19 DIAGNOSIS — C02.9 SQUAMOUS CELL CANCER OF TONGUE (H): ICD-10-CM

## 2017-05-24 RX ORDER — LEVOTHYROXINE SODIUM 50 UG/1
TABLET ORAL
Qty: 90 TABLET | Refills: 3 | Status: SHIPPED | OUTPATIENT
Start: 2017-05-24 | End: 2017-08-21

## 2017-06-12 DIAGNOSIS — C01 MALIGNANT NEOPLASM OF BASE OF TONGUE (H): ICD-10-CM

## 2017-06-12 DIAGNOSIS — E03.2 HYPOTHYROIDISM DUE TO NON-MEDICATION EXOGENOUS SUBSTANCES: ICD-10-CM

## 2017-06-12 LAB
ALBUMIN SERPL-MCNC: 4 G/DL (ref 3.4–5)
ALP SERPL-CCNC: 95 U/L (ref 40–150)
ALT SERPL W P-5'-P-CCNC: 37 U/L (ref 0–70)
ANION GAP SERPL CALCULATED.3IONS-SCNC: 7 MMOL/L (ref 3–14)
AST SERPL W P-5'-P-CCNC: 20 U/L (ref 0–45)
BASOPHILS # BLD AUTO: 0 10E9/L (ref 0–0.2)
BASOPHILS NFR BLD AUTO: 0.4 %
BILIRUB SERPL-MCNC: 0.9 MG/DL (ref 0.2–1.3)
BUN SERPL-MCNC: 22 MG/DL (ref 7–30)
CALCIUM SERPL-MCNC: 8.8 MG/DL (ref 8.5–10.1)
CHLORIDE SERPL-SCNC: 109 MMOL/L (ref 94–109)
CO2 SERPL-SCNC: 26 MMOL/L (ref 20–32)
CREAT SERPL-MCNC: 1 MG/DL (ref 0.66–1.25)
DIFFERENTIAL METHOD BLD: NORMAL
EOSINOPHIL # BLD AUTO: 0.1 10E9/L (ref 0–0.7)
EOSINOPHIL NFR BLD AUTO: 1.6 %
ERYTHROCYTE [DISTWIDTH] IN BLOOD BY AUTOMATED COUNT: 13.9 % (ref 10–15)
GFR SERPL CREATININE-BSD FRML MDRD: 74 ML/MIN/1.7M2
GLUCOSE SERPL-MCNC: 96 MG/DL (ref 70–99)
HCT VFR BLD AUTO: 45.1 % (ref 40–53)
HGB BLD-MCNC: 15.7 G/DL (ref 13.3–17.7)
LYMPHOCYTES # BLD AUTO: 1.7 10E9/L (ref 0.8–5.3)
LYMPHOCYTES NFR BLD AUTO: 25.1 %
MCH RBC QN AUTO: 30.8 PG (ref 26.5–33)
MCHC RBC AUTO-ENTMCNC: 34.8 G/DL (ref 31.5–36.5)
MCV RBC AUTO: 89 FL (ref 78–100)
MONOCYTES # BLD AUTO: 0.6 10E9/L (ref 0–1.3)
MONOCYTES NFR BLD AUTO: 8.5 %
NEUTROPHILS # BLD AUTO: 4.4 10E9/L (ref 1.6–8.3)
NEUTROPHILS NFR BLD AUTO: 64.4 %
PLATELET # BLD AUTO: 222 10E9/L (ref 150–450)
POTASSIUM SERPL-SCNC: 4.1 MMOL/L (ref 3.4–5.3)
PROT SERPL-MCNC: 7.2 G/DL (ref 6.8–8.8)
RBC # BLD AUTO: 5.09 10E12/L (ref 4.4–5.9)
SODIUM SERPL-SCNC: 142 MMOL/L (ref 133–144)
T4 FREE SERPL-MCNC: 1.08 NG/DL (ref 0.76–1.46)
TSH SERPL DL<=0.005 MIU/L-ACNC: 4.28 MU/L (ref 0.4–4)
WBC # BLD AUTO: 6.8 10E9/L (ref 4–11)

## 2017-06-12 PROCEDURE — 80050 GENERAL HEALTH PANEL: CPT | Performed by: INTERNAL MEDICINE

## 2017-06-12 PROCEDURE — 84439 ASSAY OF FREE THYROXINE: CPT | Performed by: INTERNAL MEDICINE

## 2017-06-12 PROCEDURE — 36415 COLL VENOUS BLD VENIPUNCTURE: CPT | Performed by: INTERNAL MEDICINE

## 2017-06-15 ENCOUNTER — TELEPHONE (OUTPATIENT)
Dept: ONCOLOGY | Facility: CLINIC | Age: 72
End: 2017-06-15

## 2017-06-15 NOTE — TELEPHONE ENCOUNTER
Patient calling for lab results. Expressed frustration with not receiving results when calling earlier. Discussed with patient all results were in normal range except TSH. Patient states he has been very tired lately and is wondering if his thyroid medication needs to be adjusted. Provided patient with phone number for Dr Jeff and this RN will message Dr Mueller to provide patient with answer. Patient states he lives in [place], [place] and would like to transfer care to [place]. Provided pt with phone number for scheduling at [place]. Patient will call to make appointment.  Loree Otero  RN, BSN, OCN

## 2017-06-16 DIAGNOSIS — E03.9 HYPOTHYROIDISM: Primary | ICD-10-CM

## 2017-06-16 RX ORDER — LEVOTHYROXINE SODIUM 100 UG/1
100 TABLET ORAL DAILY
Qty: 90 TABLET | Refills: 3 | Status: SHIPPED | OUTPATIENT
Start: 2017-06-16 | End: 2018-07-11

## 2017-08-01 DIAGNOSIS — C01 MALIGNANT NEOPLASM OF BASE OF TONGUE (H): Primary | ICD-10-CM

## 2017-08-01 DIAGNOSIS — E03.2 HYPOTHYROIDISM DUE TO NON-MEDICATION EXOGENOUS SUBSTANCES: ICD-10-CM

## 2017-08-14 ENCOUNTER — HOSPITAL ENCOUNTER (OUTPATIENT)
Dept: CT IMAGING | Facility: CLINIC | Age: 72
End: 2017-08-14
Attending: INTERNAL MEDICINE
Payer: MEDICARE

## 2017-08-14 ENCOUNTER — HOSPITAL ENCOUNTER (OUTPATIENT)
Dept: CT IMAGING | Facility: CLINIC | Age: 72
Discharge: HOME | End: 2017-08-14
Attending: INTERNAL MEDICINE | Admitting: INTERNAL MEDICINE
Payer: MEDICARE

## 2017-08-14 DIAGNOSIS — E03.2 HYPOTHYROIDISM DUE TO NON-MEDICATION EXOGENOUS SUBSTANCES: ICD-10-CM

## 2017-08-14 DIAGNOSIS — C01 MALIGNANT NEOPLASM OF BASE OF TONGUE (H): ICD-10-CM

## 2017-08-14 LAB
CREAT BLD-MCNC: 0.9 MG/DL (ref 0.66–1.25)
GFR SERPL CREATININE-BSD FRML MDRD: 83 ML/MIN/1.7M2

## 2017-08-14 PROCEDURE — 25000128 H RX IP 250 OP 636: Performed by: RADIOLOGY

## 2017-08-14 PROCEDURE — 71260 CT THORAX DX C+: CPT

## 2017-08-14 PROCEDURE — 25000125 ZZHC RX 250: Performed by: RADIOLOGY

## 2017-08-14 PROCEDURE — 70491 CT SOFT TISSUE NECK W/DYE: CPT

## 2017-08-14 PROCEDURE — 82565 ASSAY OF CREATININE: CPT | Performed by: INTERNAL MEDICINE

## 2017-08-14 RX ORDER — IOPAMIDOL 755 MG/ML
100 INJECTION, SOLUTION INTRAVASCULAR ONCE
Status: COMPLETED | OUTPATIENT
Start: 2017-08-14 | End: 2017-08-14

## 2017-08-14 RX ADMIN — IOPAMIDOL 100 ML: 755 INJECTION, SOLUTION INTRAVENOUS at 10:16

## 2017-08-14 RX ADMIN — SODIUM CHLORIDE 65 ML: 9 INJECTION, SOLUTION INTRAVENOUS at 10:16

## 2017-08-21 ENCOUNTER — ONCOLOGY VISIT (OUTPATIENT)
Dept: ONCOLOGY | Facility: CLINIC | Age: 72
End: 2017-08-21
Payer: MEDICARE

## 2017-08-21 VITALS
OXYGEN SATURATION: 97 % | RESPIRATION RATE: 15 BRPM | HEIGHT: 73 IN | DIASTOLIC BLOOD PRESSURE: 78 MMHG | BODY MASS INDEX: 31.41 KG/M2 | WEIGHT: 237 LBS | SYSTOLIC BLOOD PRESSURE: 130 MMHG | HEART RATE: 52 BPM

## 2017-08-21 DIAGNOSIS — E03.2 HYPOTHYROIDISM DUE TO NON-MEDICATION EXOGENOUS SUBSTANCES: ICD-10-CM

## 2017-08-21 DIAGNOSIS — C01 MALIGNANT NEOPLASM OF BASE OF TONGUE (H): ICD-10-CM

## 2017-08-21 DIAGNOSIS — C02.9 SQUAMOUS CELL CANCER OF TONGUE (H): Primary | ICD-10-CM

## 2017-08-21 LAB
ALBUMIN SERPL-MCNC: 3.9 G/DL (ref 3.4–5)
ALP SERPL-CCNC: 85 U/L (ref 40–150)
ALT SERPL W P-5'-P-CCNC: 38 U/L (ref 0–70)
ANION GAP SERPL CALCULATED.3IONS-SCNC: 7 MMOL/L (ref 3–14)
AST SERPL W P-5'-P-CCNC: 20 U/L (ref 0–45)
BASOPHILS # BLD AUTO: 0 10E9/L (ref 0–0.2)
BASOPHILS NFR BLD AUTO: 0.5 %
BILIRUB SERPL-MCNC: 1.4 MG/DL (ref 0.2–1.3)
BUN SERPL-MCNC: 19 MG/DL (ref 7–30)
CALCIUM SERPL-MCNC: 9 MG/DL (ref 8.5–10.1)
CHLORIDE SERPL-SCNC: 108 MMOL/L (ref 94–109)
CO2 SERPL-SCNC: 24 MMOL/L (ref 20–32)
CREAT SERPL-MCNC: 0.7 MG/DL (ref 0.66–1.25)
DIFFERENTIAL METHOD BLD: NORMAL
EOSINOPHIL # BLD AUTO: 0.1 10E9/L (ref 0–0.7)
EOSINOPHIL NFR BLD AUTO: 1.7 %
ERYTHROCYTE [DISTWIDTH] IN BLOOD BY AUTOMATED COUNT: 13.4 % (ref 10–15)
GFR SERPL CREATININE-BSD FRML MDRD: >90 ML/MIN/1.7M2
GLUCOSE SERPL-MCNC: 103 MG/DL (ref 70–99)
HCT VFR BLD AUTO: 43.5 % (ref 40–53)
HGB BLD-MCNC: 15.4 G/DL (ref 13.3–17.7)
LDH SERPL L TO P-CCNC: 182 U/L (ref 85–227)
LYMPHOCYTES # BLD AUTO: 1.5 10E9/L (ref 0.8–5.3)
LYMPHOCYTES NFR BLD AUTO: 26 %
MCH RBC QN AUTO: 31.4 PG (ref 26.5–33)
MCHC RBC AUTO-ENTMCNC: 35.4 G/DL (ref 31.5–36.5)
MCV RBC AUTO: 89 FL (ref 78–100)
MONOCYTES # BLD AUTO: 0.5 10E9/L (ref 0–1.3)
MONOCYTES NFR BLD AUTO: 8.2 %
NEUTROPHILS # BLD AUTO: 3.6 10E9/L (ref 1.6–8.3)
NEUTROPHILS NFR BLD AUTO: 63.6 %
PLATELET # BLD AUTO: 208 10E9/L (ref 150–450)
POTASSIUM SERPL-SCNC: 4 MMOL/L (ref 3.4–5.3)
PROT SERPL-MCNC: 7.1 G/DL (ref 6.8–8.8)
RBC # BLD AUTO: 4.9 10E12/L (ref 4.4–5.9)
SODIUM SERPL-SCNC: 139 MMOL/L (ref 133–144)
TSH SERPL DL<=0.005 MIU/L-ACNC: 1.67 MU/L (ref 0.4–4)
WBC # BLD AUTO: 5.7 10E9/L (ref 4–11)

## 2017-08-21 PROCEDURE — 99213 OFFICE O/P EST LOW 20 MIN: CPT | Performed by: INTERNAL MEDICINE

## 2017-08-21 PROCEDURE — 80050 GENERAL HEALTH PANEL: CPT | Performed by: INTERNAL MEDICINE

## 2017-08-21 PROCEDURE — 83615 LACTATE (LD) (LDH) ENZYME: CPT | Performed by: INTERNAL MEDICINE

## 2017-08-21 PROCEDURE — 36415 COLL VENOUS BLD VENIPUNCTURE: CPT | Performed by: INTERNAL MEDICINE

## 2017-08-21 ASSESSMENT — PAIN SCALES - GENERAL: PAINLEVEL: NO PAIN (0)

## 2017-08-21 NOTE — MR AVS SNAPSHOT
After Visit Summary   8/21/2017    Paulie Mcdonough    MRN: [id number]           Patient Information     Date Of Birth          1945        Visit Information        Provider Department      8/21/2017 1:00 PM Jovi Mueller MD [facility]        Today's Diagnoses     Squamous cell cancer of tongue (H)    -  1    Hypothyroidism due to non-medication exogenous substances           Follow-ups after your visit        Your next 10 appointments already scheduled     Oct 02, 2017 10:00 AM CDT   (Arrive by 9:45 AM)   RETURN TUMOR VISIT with Almas Jeff MD   [facility] ([facility])    [address]  4th [address]  [facility] [address] [phone]            Jul 30, 2018  9:40 AM CDT   LAB with LAB FIRST FLOOR [facility] ([facility])    [address] [place] [phone] [phone]           Patient must bring picture ID. Patient should be prepared to give a urine specimen  Please do not eat 10-12 hours before your appointment if you are coming in fasting for labs on lipids, cholesterol, or glucose (sugar). Pregnant women should follow their Care Team instructions. Water with medications is okay. Do not drink coffee or other fluids. If you have concerns about taking  your medications, please ask at office or if scheduling via [employer], send a message by clicking on Secure Messaging, Message Your Care Team.            Jul 30, 2018 10:00 AM CDT   CT SOFT TISSUE NECK W CONTRAST with MGCT1   [facility] ([facility])    [address] [address] [place] [address] [phone]           Please bring any scans or X-rays taken at other hospitals, if similar tests were done. Also bring a list of your medicines, including vitamins, minerals and over-the-counter drugs. It is safest to leave personal items at home.  Be sure to tell your doctor:    If you have any allergies.   If there s any chance you are pregnant.   If you are breastfeeding.   If you have any special needs.  You will have contrast for this exam. To prepare:   Do not eat or drink for 2 hours before your exam. If you need to take medicine, you may take it with small sips of water. (We may ask you to take liquid medicine as well.)   The day before your exam, drink extra fluids at least six 8-ounce glasses (unless your doctor tells you to restrict your fluids).  Patients over 70 or patients with diabetes or kidney problems:   If you haven t had a blood test (creatinine test) within the last 30 days, go to your clinic or Diagnostic Imaging Department for this test.  If you have diabetes:   If your kidney function is normal, continue taking your metformin (Avandamet, Glucophage, Glucovance, Metaglip) on the day of your exam.   If your kidney function is abnormal, wait 48 hours before restarting this medicine.  Please wear loose clothing, such as a sweat suit or jogging clothes. Avoid snaps, zippers and other metal. We may ask you to undress and put on a hospital gown.  If you have any questions, please call the Imaging Department where you will have your exam.            Jul 30, 2018 10:30 AM CDT   CT CHEST W CONTRAST with MGCT1   [facility] ([facility])    [address] [place] [address] [phone]           Please bring any scans or X-rays taken at other hospitals, if similar tests were done. Also bring a list of your medicines, including vitamins, minerals and over-the-counter drugs. It is safest to leave personal items at home.  Be sure to tell your doctor:   If you have any allergies.   If there s any chance you are pregnant.   If you are breastfeeding.   If you have any special needs.  You will have contrast for this exam. To prepare:   Do not eat or drink for 2 hours before your exam. If you need to take medicine, you may take it with  small sips of water. (We may ask you to take liquid medicine as well.)   The day before your exam, drink extra fluids at least six 8-ounce glasses (unless your doctor tells you to restrict your fluids).  Patients over 70 or patients with diabetes or kidney problems:   If you haven t had a blood test (creatinine test) within the last 30 days, go to your clinic or Diagnostic Imaging Department for this test.  If you have diabetes:   If your kidney function is normal, continue taking your metformin (Avandamet, Glucophage, Glucovance, Metaglip) on the day of your exam.   If your kidney function is abnormal, wait 48 hours before restarting this medicine.  Please wear loose clothing, such as a sweat suit or jogging clothes. Avoid snaps, zippers and other metal. We may ask you to undress and put on a hospital gown.  If you have any questions, please call the Imaging Department where you will have your exam.            Aug 06, 2018  1:30 PM CDT   Return Visit with Jovi Mueller MD   [facility]    [address] [place] [address] [phone]              Future tests that were ordered for you today     Open Future Orders        Priority Expected Expires Ordered    CBC with platelets differential Routine 9/21/2017 8/22/2018 8/21/2017    Comprehensive metabolic panel Routine 9/21/2017 8/22/2018 8/21/2017    CT Chest w Contrast Routine 9/21/2017 8/22/2018 8/21/2017    TSH with free T4 reflex Routine 9/21/2017 8/22/2018 8/21/2017    CT Soft Tissue Neck w Contrast Routine 9/21/2017 8/22/2018 8/21/2017            Who to contact     If you have questions or need follow up information about today's clinic visit or your schedule please contact [facility] directly at [phone].  Normal or non-critical lab and imaging results will be communicated to you by MyChart, letter or phone within 4 business days after the clinic has received the  "results. If you do not hear from us within 7 days, please contact the clinic through [employer] or phone. If you have a critical or abnormal lab result, we will notify you by phone as soon as possible.  Submit refill requests through [employer] or call your pharmacy and they will forward the refill request to us. Please allow 3 business days for your refill to be completed.          Additional Information About Your Visit        [employer] Information     [employer] gives you secure access to your electronic health record. If you see a primary care provider, you can also send messages to your care team and make appointments. If you have questions, please call your primary care clinic.  If you do not have a primary care provider, please call [phone] and they will assist you.      [employer] is an electronic gateway that provides easy, online access to your medical records. With [employer], you can request a clinic appointment, read your test results, renew a prescription or communicate with your care team.     To access your existing account, please contact your [facility] or call [phone] for assistance.        Care EveryWhere ID     This is your Care EveryWhere ID. This could be used by other organizations to access your [place] medical records  [id number]        Your Vitals Were     Pulse Respirations Height Pulse Oximetry BMI (Body Mass Index)       52 15 1.854 m (6' 0.99\") 97% 31.28 kg/m2        Blood Pressure from Last 3 Encounters:   08/21/17 130/78   02/20/17 124/73   08/15/16 150/84    Weight from Last 3 Encounters:   08/21/17 107.5 kg (237 lb)   04/03/17 109.3 kg (241 lb)   02/20/17 105.2 kg (232 lb)               Primary Care Provider    [facility]       No address on file        Equal Access to Services     LISA SABA : Yaima Nelson, reggie brandt, polina wells. So [facility] " [phone].    ATENCIÓN: Si billy pool, tiene a mosley disposición servicios gratuitos de asistencia lingüística. Humera kim [phone].    We comply with applicable federal civil rights laws and [place] laws. We do not discriminate on the basis of race, color, national origin, age, disability sex, sexual orientation or gender identity.            Thank you!     Thank you for choosing [facility]  for your care. Our goal is always to provide you with excellent care. Hearing back from our patients is one way we can continue to improve our services. Please take a few minutes to complete the written survey that you may receive in the mail after your visit with us. Thank you!             Your Updated Medication List - Protect others around you: Learn how to safely use, store and throw away your medicines at www.disposemymeds.org.          This list is accurate as of: 8/21/17  2:44 PM.  Always use your most recent med list.                   Brand Name Dispense Instructions for use Diagnosis    DOXAZOSIN MESYLATE PO      Take 2 mg by mouth daily    Malignant neoplasm of base of tongue (H), Hypothyroidism due to non-medication exogenous substances       finasteride 5 MG tablet    PROSCAR     Take 5 mg by mouth Reported on 4/3/2017        FLUZONE HIGH-DOSE 0.5 ML injection   Generic drug:  influenza Vac Split High-Dose      Reported on 4/3/2017        levothyroxine 100 MCG tablet    SYNTHROID/LEVOTHROID    90 tablet    Take 1 tablet (100 mcg) by mouth daily    Hypothyroidism       loratadine 10 MG tablet    CLARITIN     Take 10 mg by mouth daily Reported on 4/3/2017        OMEPRAZOLE PO      Take 20 mg by mouth daily Reported on 4/3/2017    Malignant neoplasm of base of tongue (H), Hypothyroidism

## 2017-08-21 NOTE — NURSING NOTE
"Oncology Rooming Note    August 21, 2017 1:07 PM   Paulie Mcdonough is a 72 year old male who presents for:    Chief Complaint   Patient presents with     Oncology Clinic Visit     follow  up 6 month     Initial Vitals: /78  Pulse 52  Resp 15  Ht 1.854 m (6' 0.99\")  Wt 107.5 kg (237 lb)  SpO2 97%  BMI 31.28 kg/m2 Estimated body mass index is 31.28 kg/(m^2) as calculated from the following:    Height as of this encounter: 1.854 m (6' 0.99\").    Weight as of this encounter: 107.5 kg (237 lb). Body surface area is 2.35 meters squared.  No Pain (0) Comment: Data Unavailable   No LMP for male patient.  Allergies reviewed: Yes  Medications reviewed: Yes    Medications: Medication refills not needed today.  Pharmacy name entered into [employer]:    [id number] - [place], [place] - [address]  ( [address])  [facility] - [place], [place] - [address]/[facility] #[id number] - [place] - [address] [place] [address]        5 minutes for nursing intake (face to face time)     Ines Denise LPN              "

## 2017-08-21 NOTE — PROGRESS NOTES
[facility]  FOLLOW-UP VISIT NOTE  Date of visit: Aug 21, 2017       Reason for Visit: follow-up SCC BOT, completed concurrent chemoradiation therapy with HD cisplatin 8/2013    Chemotherapy 6/24/2013 7/15/2013 8/5/2013   Day, Cycle Day 1, Cycle 1 Day 22, Cycle 1 Day 43, Cycle 1   CISplatin (PLATINOL)  mg 230 mg 230 mg     HPI: Mr. 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 He began having a sore throat, increasing expectoration, and eventually started having blood in his sputum. He started having coughing spells, and had a particularly bad episode on Memorial Day weekend. He presented to the Emergency Department where he was evaluated with imaging and was notified that he might have cancer. He was referred to an ear, nose and throat specialist locally in [place]. His ENT surgeon in [place] did confirm that he did have cancer, and referred him to the [facility] where he was seen by Dr. Hernandez on 06/06. At this visit, he did express shortness of breath, which was progressively getting worse, and difficulty managing his secretions, especially during the night. He used to keep the head end of the bed elevated and had some noisy breathing. Dr. Hernandez did perform a flexible fiberoptic direct laryngoscopy where a large mass occupying greater than 50% of the oropharyngeal airway was noted, which seemed to be arising from the base of the tongue. It did fill the vallecula and extended along the lateral pharyngeal wall into the hypopharynx and supraglottis. Biopsy of right tongue base revealed poorly differentiated squamous cell carcinoma. The mass seemed to be obstructing the airway, and, although the patient was not in any immediate airway distress, it was recommended that he undergo tracheostomy to secure the airway. He was admitted for this on 06/07.. Thoracic Surgery  "was consulted to get a port and a PEG placed.     PET/CT done 6/10/13 showed no lymphadenopathy in neck and no metastatic disease.  He presented to the ED 6/13/13 with severe coughing episode and hematemesis, as well as coffee-ground drainage from PET tube. He had an embolization to the tumor done at [facility] 6/14. He was seen in Medical Oncology on 6/14/13 with plan to proceed with definitive concurrent chemoradiation with cisplatin every 3 weeks and daily radiation with a curative intent. He presented again to the ED 6/18/13 with hematemesis for over 2 hours and melena and admitted briefly overnight on observation. ENT concerned to do another embolization or surgery due to tumor friability and if patient required neck dissection in future. Discharged 6/19/13 with hopes that starting treatment will control the bleeding. He started HD cisplatin and daily radiation 6/24/13. He did exceptionally well throughout concurrent therapy until the last few days of XRT he was admitted due to throat pain, managing secretions and difficulty with PEG tolerability.    Interval History:   Mr. Mcdonough is doing well.     Paulie is doing well except for having fatigue. He notes that he has joined a part time job and is working more than many do in a full time position. This leaves him tired.     ROS:  No other f/s/c. No chest pain/dyspnea.  Daily BM, no black/bloody stools. No urinary issues. No neuropathy.  Mood is good.    Current Outpatient Prescriptions   Medication     levothyroxine (SYNTHROID/LEVOTHROID) 100 MCG tablet     DOXAZOSIN MESYLATE PO     OMEPRAZOLE PO     loratadine (CLARITIN) 10 MG tablet     [DISCONTINUED] levothyroxine (SYNTHROID/LEVOTHROID) 50 MCG tablet     finasteride (PROSCAR) 5 MG tablet     FLUZONE HIGH-DOSE 0.5 ML injection     No current facility-administered medications for this visit.        PHYSICAL EXAM:  /78  Pulse 52  Resp 15  Ht 1.854 m (6' 0.99\")  Wt 107.5 kg (237 lb)  SpO2 97%  BMI 31.28 " kg/m2  Wt Readings from Last 4 Encounters:   08/21/17 107.5 kg (237 lb)   04/03/17 109.3 kg (241 lb)   02/20/17 105.2 kg (232 lb)   08/15/16 106.6 kg (235 lb)       General: Alert, oriented, pleasant, NAD.   Skin: No rashes on exposed skin  HEENT: Normocephalic, atraumatic, PERRLA, EOMI. No mucositis, exposed bone or thrush  Neck: No cervical or supraclavicular LAD.   Lungs: CTA bilaterally, normal work of breathing  Cardiac: RRR, S1, S2, no murmurs  Abdomen: Soft, nontender, nondistended. Normoactive bowel sounds. No hepatosplenomegaly.  Neuro: CNII-XII grossly intact    Labs:   Recent Labs   Lab Test  08/21/17   1206  06/12/17   1115  02/20/17   1323  08/08/16   0838  02/08/16   0947   NA  139  142  140  140  142   POTASSIUM  4.0  4.1  4.4  4.0  4.2   CHLORIDE  108  109  108  108  109   CO2  24  26  25  27  27   ANIONGAP  7  7  7  5  6   BUN  19  22  18  15  18   CR  0.70  1.00  1.02  0.97  0.90   GLC  103*  96  96  101*  101*   TINO  9.0  8.8  9.2  8.6  8.3*     Recent Labs   Lab Test  09/26/13   0755  08/29/13   1113  08/22/13   0940  08/20/13   0634  08/19/13   0733  08/16/13   0608  08/15/13   0749   08/14/13   0546   MAG  2.1  2.1  1.7  1.8  1.6  1.7  1.8   < >  1.5*   PHOS   --    --   3.3   --   3.0  3.2  2.7   --   2.8    < > = values in this interval not displayed.     Recent Labs   Lab Test  08/21/17   1206  06/12/17   1115  02/20/17   1323  08/08/16   0838  02/08/16   0947   WBC  5.7  6.8  7.9  6.9  6.2   HGB  15.4  15.7  15.3  15.9  15.0   PLT  208  222  235  216  199   MCV  89  89  88  89  89   NEUTROPHIL  63.6  64.4  70.6  67.1  75.0     Recent Labs   Lab Test  08/21/17   1206  06/12/17   1115  02/20/17   1323   02/08/16   0947   04/13/15   1301   BILITOTAL  1.4*  0.9  1.0   < >  0.8   < >  1.0   ALKPHOS  85  95  76   < >  79   < >  91   ALT  38  37  32   < >  30   < >  32   AST  20  20  17   < >  22   < >  18   ALBUMIN  3.9  4.0  3.8   < >  3.9   < >  3.9   LDH  182   --    --    --   190   --   172     < > = values in this interval not displayed.     TSH   Date Value Ref Range Status   08/21/2017 1.67 0.40 - 4.00 mU/L Final   06/12/2017 4.28 (H) 0.40 - 4.00 mU/L Final   02/20/2017 2.58 0.40 - 4.00 mU/L Final     No results for input(s): CEA in the last 91593 hours.  Results for orders placed or performed during the hospital encounter of 08/14/17   CT Chest w Contrast    Narrative    CT CHEST WITH CONTRAST  8/14/2017 10:32 AM    HISTORY:  Malignant neoplasm of base of tongue. 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    TECHNIQUE: Scans obtained from the apices through the diaphragm with  IV contrast. 60 mL Isovue-370 mL injected.  Radiation dose for this scan was reduced using automated exposure  control, adjustment of the mA and/or kV according to patient size, or  iterative reconstruction technique.    COMPARISON:  CT chest dated 8/8/2016, 8/7/2015, 4/13/2015, 6/23/2014.    FINDINGS:  Small right thyroid nodule is again noted and was also  previously seen on 6/23/2014. This is highly likely benign. Visualized  portions of the thyroid are otherwise unremarkable. No mediastinal,  hilar, or axillary lymphadenopathy. Mild thoracic aortic  calcifications are noted. Thoracic aorta is otherwise of normal  appearance without evidence for aneurysm or dissection. No central  pulmonary artery  filling defects to suggest central pulmonary artery  embolism. Heart is normal in size. There are coronary artery  calcifications. No mediastinal, hilar, or axillary lymphadenopathy.    Lungs remain clear without nodule, mass, infiltrate, effusion, or  pneumothorax.    There are degenerative changes in the spine. No aggressive osseous  lesions are seen. Probable nonobstructive right intrarenal calculus is  noted (image 66 series 3) measuring up to 0.2 cm. Left renal cyst  measures up to 7.5 cm. Visualized portions of the upper abdominal  contents are otherwise unremarkable.      Impression     IMPRESSION:  1. Tiny right thyroid nodule is again noted.  2. Coronary artery and aortic atherosclerosis.  3. Nonobstructive right intrarenal calculus.  4. Left renal cyst. This area was not included on the prior study.  5. No definite metastasis is identified.    GIDEON PETTY MD     CT SCAN OF THE NECK WITH CONTRAST  8/14/2017 10:31 AM      HISTORY: Malignant neoplasm of base of tongue. 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     TECHNIQUE:  Axial images and coronal reformations. 40 mL Isovue-370  IV.  Radiation dose for this scan was reduced using automated exposure  control, adjustment of the mA and/or kV according to patient size, or  iterative reconstruction technique.     COMPARISON: 8/8/2016.     FINDINGS: Again seen is some edematous changes involving the  aryepiglottic folds and epiglottis. There is no evidence for any soft  tissue masses or lymphadenopathy. Specifically, no evidence for any  discrete tongue base lesions on this exam. The salivary glands are  normal. Minimal mucosal thickening or retention cyst is seen in the  paranasal sinuses. The larynx and subglottic trachea are normal. The  thyroid gland has a low-density area in the right lobe which is less  than 1 cm, statistically benign. This is also stable. The lung apices  are clear. Osseous structures are unremarkable except for some  degenerative changes.         IMPRESSION:  1. No evidence for any new recurrent or metastatic disease.  2. Persistent mild edematous changes in the hypopharynx.     ALAINA MATTHEW MD               Assessment & Plan:   1. 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 ECOG PS 0  3. NO significant medical comorbidities  4. restaging CT scan do not show evidence of disease  5. Hearing loss on chemoradiation  6. HTN and weight gain; fatigue and shortness of breath    We had a lengthy discussion with Paulie about his recovery and now is over 4 years from completion of therapy and clinically  there is no evidence of recurrence. I have reviewed his CT scan and there is no evidence of disease. Reassured that there is no evidence of cancer recurrence by today's labs, physical exam or imaging studies. He has not been strict with his follow up in ENT.  He did meet Dr. Almas Jeff about 4 months ago and is again due for his next follow-up but has nothing scheduled.  I encouraged him to keep his apponitment for continued surveillance. I will help him schedule this appointment    He is euthyroid.     We will follow up in about a year with labs and restaging scans.

## 2018-02-26 ENCOUNTER — OFFICE VISIT (OUTPATIENT)
Dept: OTOLARYNGOLOGY | Facility: CLINIC | Age: 73
End: 2018-02-26
Payer: MEDICARE

## 2018-02-26 VITALS — WEIGHT: 230 LBS | BODY MASS INDEX: 30.35 KG/M2

## 2018-02-26 DIAGNOSIS — C01 MALIGNANT NEOPLASM OF BASE OF TONGUE (H): Primary | ICD-10-CM

## 2018-02-26 RX ORDER — PREDNISOLONE ACETATE 10 MG/ML
1 SUSPENSION/ DROPS OPHTHALMIC 4 TIMES DAILY
COMMUNITY
End: 2019-10-01

## 2018-02-26 ASSESSMENT — PAIN SCALES - GENERAL: PAINLEVEL: NO PAIN (0)

## 2018-02-26 NOTE — NURSING NOTE
Chief Complaint   Patient presents with     RECHECK     follow up     Weight 104.3 kg (230 lb).    Rafael Viveros LPN

## 2018-02-26 NOTE — MR AVS SNAPSHOT
After Visit Summary   2/26/2018    Paulie Mcdonough    MRN: [id number]           Patient Information     Date Of Birth          1945        Visit Information        Provider Department      2/26/2018 11:30 AM Almas Jeff MD [facility]        Today's Diagnoses     MALIG NEOPLASM TONGUE BASE    -  1      Care Instructions    Follow up with Dr. Jeff in 6 months  Call me if ?'s arise [phone]  Summer Velazquez RN          Follow-ups after your visit        Your next 10 appointments already scheduled     Jul 30, 2018  9:40 AM CDT   LAB with LAB FIRST FLOOR [facility] ([facility])    [address] [place] [id number] [phone]           Please do not eat 10-12 hours before your appointment if you are coming in fasting for labs on lipids, cholesterol, or glucose (sugar). This does not apply to pregnant women. Water, hot tea and black coffee (with nothing added) are okay. Do not drink other fluids, diet soda or chew gum.            Jul 30, 2018 10:00 AM CDT   CT SOFT TISSUE NECK W CONTRAST with MGCT1   [facility] ([facility])    [address] [place] [id number] [phone]           Please bring any scans or X-rays taken at other hospitals, if similar tests were done. Also bring a list of your medicines, including vitamins, minerals and over-the-counter drugs. It is safest to leave personal items at home.  Be sure to tell your doctor:   If you have any allergies.   If there s any chance you are pregnant.   If you are breastfeeding.    If you have diabetes as your medication may need to be adjusted for this exam.  You will have contrast for this exam. To prepare:   Do not eat or drink for 2 hours before your exam. If you need to take medicine, you may take it with small sips of water. (We may ask you to take liquid medicine as well.)   The day before your exam, drink extra  fluids at least six 8-ounce glasses (unless your doctor tells you to restrict your fluids).  Patients over 70 or patients with diabetes or kidney problems:   If you haven t had a blood test (creatinine test) within the last 30 days, the Cardiologist/Radiologist may require you to get this test prior to your exam.  Please wear loose clothing, such as a sweat suit or jogging clothes. Avoid snaps, zippers and other metal. We may ask you to undress and put on a hospital gown.  If you have any questions, please call the Imaging Department where you will have your exam.            Jul 30, 2018 10:30 AM CDT   CT CHEST W CONTRAST with MGCT1   [facility]    [id number] [place] [id number] [phone]           Please bring any scans or X-rays taken at other hospitals, if similar tests were done. Also bring a list of your medicines, including vitamins, minerals and over-the-counter drugs. It is safest to leave personal items at home.  Be sure to tell your doctor:   If you have any allergies.   If there s any chance you are pregnant.   If you are breastfeeding.    If you have diabetes as your medication may need to be adjusted for this exam.  You will have contrast for this exam. To prepare:   Do not eat or drink for 2 hours before your exam. If you need to take medicine, you may take it with small sips of water. (We may ask you to take liquid medicine as well.)   The day before your exam, drink extra fluids at least six 8-ounce glasses (unless your doctor tells you to restrict your fluids).  Patients over 70 or patients with diabetes or kidney problems:   If you haven t had a blood test (creatinine test) within the last 30 days, the Cardiologist/Radiologist may require you to get this test prior to your exam.  Please wear loose clothing, such as a sweat suit or jogging clothes. Avoid snaps, zippers and other metal. We may ask you to undress and put on a  [facility].  If you have any questions, please call the Imaging Department where you will have your exam.            Aug 06, 2018  1:30 PM CDT   Return Visit with Jovi Mueller MD   [facility] ([facility])    [address] [address] [place] [id number] [phone]              Who to contact     Please call your clinic at [phone] to:    Ask questions about your health    Make or cancel appointments    Discuss your medicines    Learn about your test results    Speak to your doctor            Additional Information About Your Visit        [facility] Information     [employer] gives you secure access to your electronic health record. If you see a primary care provider, you can also send messages to your care team and make appointments. If you have questions, please call your primary care clinic.  If you do not have a primary care provider, please call [phone] and they will assist you.      [employer] is an electronic gateway that provides easy, online access to your medical records. With [employer], you can request a clinic appointment, read your test results, renew a prescription or communicate with your care team.     To access your existing account, please contact your [facility] or call [phone] for assistance.        Care EveryWhere ID     This is your Care EveryWhere ID. This could be used by other organizations to access your [facility] medical records  [id number]        Your Vitals Were     BMI (Body Mass Index)                   30.35 kg/m2            Blood Pressure from Last 3 Encounters:   08/21/17 130/78   02/20/17 124/73   08/15/16 150/84    Weight from Last 3 Encounters:   02/26/18 104.3 kg (230 lb)   08/21/17 107.5 kg (237 lb)   04/03/17 109.3 kg (241 lb)              Today, you had the following     No orders found for display       Primary Care Provider Office Phone # Fax #    [facility]  [phone]       [address]  [facility] [address]        Equal Access to Services     LISA SABA : Hadii asaf alamo baron Nelson, wamarioda luaida, qaeshata kailsada dm, polina ramirez. So [facility] [phone].    ATENCIÓN: Si habla español, tiene a mosley disposición servicios gratuitos de asistencia lingüística. Llame al [phone].    We comply with applicable federal civil rights laws and [place] laws. We do not discriminate on the basis of race, color, national origin, age, disability, sex, sexual orientation, or gender identity.            Thank you!     Thank you for choosing [facility] E[facility]  for your care. Our goal is always to provide you with excellent care. Hearing back from our patients is one way we can continue to improve our services. Please take a few minutes to complete the written survey that you may receive in the mail after your visit with us. Thank you!             Your Updated Medication List - Protect others around you: Learn how to safely use, store and throw away your medicines at www.disposemymeds.org.          This list is accurate as of 2/26/18 11:59 PM.  Always use your most recent med list.                   Brand Name Dispense Instructions for use Diagnosis    D3 ADULT PO           DOXAZOSIN MESYLATE PO      Take 2 mg by mouth daily    Malignant neoplasm of base of tongue (H), Hypothyroidism due to non-medication exogenous substances       finasteride 5 MG tablet    PROSCAR     Take 5 mg by mouth Reported on 4/3/2017        FLEXERIL PO      Take 10 mg by mouth        FLUZONE HIGH-DOSE 0.5 ML injection   Generic drug:  influenza Vac Split High-Dose      Reported on 4/3/2017        * SYNTHROID PO      Take 50 mcg by mouth        * levothyroxine 100 MCG tablet    SYNTHROID/LEVOTHROID    90 tablet    Take 1 tablet (100 mcg) by mouth daily    Hypothyroidism       loratadine 10 MG tablet    CLARITIN     Take 10 mg by mouth daily  Reported on 4/3/2017        MULTIVITAMIN ADULT PO           OMEPRAZOLE PO      Take 20 mg by mouth daily Reported on 4/3/2017    Malignant neoplasm of base of tongue (H), Hypothyroidism       prednisoLONE acetate 1 % ophthalmic susp    PRED FORTE     1 drop 4 times daily        VITAMIN B 12 PO      Take 500 mcg by mouth        * Notice:  This list has 2 medication(s) that are the same as other medications prescribed for you. Read the directions carefully, and ask your doctor or other care provider to review them with you.

## 2018-02-26 NOTE — PROGRESS NOTES
HISTORY OF PRESENT ILLNESS:  Mr. Mcdonough is seen in place of Dr. Hernandez.  He is a patient who has a history of T4 N0 M0 squamous cell carcinoma of the right tongue base that was treated with concurrent chemoradiation completed in August of 2013.  He is now 4-1/2 years out from treatment.  He was diagnosed with Lyme disease a few years ago and has recovered from that, but he in general is feeling well today.  He denies odynophagia, dysphagia, hoarseness or otalgia.  He has been dealing with an upper respiratory infection that has caused a little bit of sore throat, but there is nothing persistent.      PHYSICAL EXAMINATION:  He is well appearing and in no distress.  His weight is 230 pounds, down 11 pounds from his last visit.  Examination of the oral cavity reveals normal mucosa of the tongue, floor of mouth, hard and soft palate, gingival and buccal mucosa, retromolar trigone and posterior pharyngeal wall.  Palpation of floor of mouth, tongue and base of tongue are negative.  He cannot tolerate mirror exam.  Examination of the neck reveals no mass or lymphadenopathy.      IMPRESSION:  No evidence of disease.      PLAN:   1.  The patient had scans last August on his yearly anniversary.  We will get scans for him when he comes back in six months which will be his five year anniversary.      cc: Samuel Coffey MD    [address]   [address]       Summer Hernandez MD    [facility]

## 2018-02-26 NOTE — LETTER
2/26/2018       RE: Paulie Mcdonough  [address]   [facility] [address]     Dear Colleague,    Thank you for referring your patient, Paulie Mcdonough, to the [facility] NOSE [facility] TH[facility] at [facility]. Please see a copy of my visit note below.    HISTORY OF PRESENT ILLNESS:  Mr. Mcdonough is seen in place of Dr. Hernandez.  He is a patient who has a history of T4 N0 M0 squamous cell carcinoma of the right tongue base that was treated with concurrent chemoradiation completed in August of 2013.  He is now 4-1/2 years out from treatment.  He was diagnosed with Lyme disease a few years ago and has recovered from that, but he in general is feeling well today.  He denies odynophagia, dysphagia, hoarseness or otalgia.  He has been dealing with an upper respiratory infection that has caused a little bit of sore throat, but there is nothing persistent.      PHYSICAL EXAMINATION:  He is well appearing and in no distress.  His weight is 230 pounds, down 11 pounds from his last visit.  Examination of the oral cavity reveals normal mucosa of the tongue, floor of mouth, hard and soft palate, gingival and buccal mucosa, retromolar trigone and posterior pharyngeal wall.  Palpation of floor of mouth, tongue and base of tongue are negative.  He cannot tolerate mirror exam.  Examination of the neck reveals no mass or lymphadenopathy.      IMPRESSION:  No evidence of disease.      PLAN:   1.  The patient had scans last August on his yearly anniversary.  We will get scans for him when he comes back in six months which will be his five year anniversary.         Again, thank you for allowing me to participate in the care of your patient.      Sincerely,    Almas Jeff MD         cc: Samuel Coffey MD    [address]   [address]        Summer Hernandez MD    [facility]

## 2018-06-29 ENCOUNTER — HOSPITAL ENCOUNTER (OUTPATIENT)
Dept: CT IMAGING | Facility: CLINIC | Age: 73
End: 2018-06-29
Attending: INTERNAL MEDICINE
Payer: MEDICARE

## 2018-06-29 ENCOUNTER — HOSPITAL ENCOUNTER (OUTPATIENT)
Dept: CT IMAGING | Facility: CLINIC | Age: 73
Discharge: HOME | End: 2018-06-29
Attending: INTERNAL MEDICINE | Admitting: INTERNAL MEDICINE
Payer: MEDICARE

## 2018-06-29 DIAGNOSIS — C02.9 SQUAMOUS CELL CANCER OF TONGUE (H): ICD-10-CM

## 2018-06-29 DIAGNOSIS — E03.2 HYPOTHYROIDISM DUE TO NON-MEDICATION EXOGENOUS SUBSTANCES: ICD-10-CM

## 2018-06-29 LAB
CREAT BLD-MCNC: 0.9 MG/DL (ref 0.66–1.25)
GFR SERPL CREATININE-BSD FRML MDRD: 83 ML/MIN/1.7M2

## 2018-06-29 PROCEDURE — 25000128 H RX IP 250 OP 636: Performed by: RADIOLOGY

## 2018-06-29 PROCEDURE — 25000125 ZZHC RX 250: Performed by: RADIOLOGY

## 2018-06-29 PROCEDURE — 70491 CT SOFT TISSUE NECK W/DYE: CPT

## 2018-06-29 PROCEDURE — 71260 CT THORAX DX C+: CPT

## 2018-06-29 PROCEDURE — 82565 ASSAY OF CREATININE: CPT

## 2018-06-29 RX ORDER — IOPAMIDOL 755 MG/ML
500 INJECTION, SOLUTION INTRAVASCULAR ONCE
Status: COMPLETED | OUTPATIENT
Start: 2018-06-29 | End: 2018-06-29

## 2018-06-29 RX ADMIN — IOPAMIDOL 100 ML: 755 INJECTION, SOLUTION INTRAVENOUS at 09:19

## 2018-06-29 RX ADMIN — SODIUM CHLORIDE 65 ML: 9 INJECTION, SOLUTION INTRAVENOUS at 09:19

## 2018-07-02 ENCOUNTER — CARE COORDINATION (OUTPATIENT)
Dept: ONCOLOGY | Facility: CLINIC | Age: 73
End: 2018-07-02

## 2018-07-02 NOTE — PROGRESS NOTES
Patient called for results of CT chest - advised that results from 6/29/18 showed no evidence of metastatic disease in the lungs.  Patient has follow-up in [place] with Dr. Mueller early August.  Requests staff to send him a reminder in the mail.

## 2018-07-11 DIAGNOSIS — E03.9 HYPOTHYROIDISM: ICD-10-CM

## 2018-07-12 RX ORDER — LEVOTHYROXINE SODIUM 100 UG/1
TABLET ORAL
Qty: 90 TABLET | Refills: 0 | Status: SHIPPED | OUTPATIENT
Start: 2018-07-12 | End: 2018-10-10

## 2018-08-06 ENCOUNTER — ONCOLOGY VISIT (OUTPATIENT)
Dept: ONCOLOGY | Facility: CLINIC | Age: 73
End: 2018-08-06
Payer: MEDICARE

## 2018-08-06 VITALS
BODY MASS INDEX: 30.48 KG/M2 | SYSTOLIC BLOOD PRESSURE: 149 MMHG | RESPIRATION RATE: 16 BRPM | WEIGHT: 230 LBS | HEART RATE: 54 BPM | TEMPERATURE: 97.7 F | OXYGEN SATURATION: 100 % | HEIGHT: 73 IN | DIASTOLIC BLOOD PRESSURE: 90 MMHG

## 2018-08-06 DIAGNOSIS — C02.9 SQUAMOUS CELL CANCER OF TONGUE (H): Primary | ICD-10-CM

## 2018-08-06 DIAGNOSIS — E03.2 HYPOTHYROIDISM DUE TO NON-MEDICATION EXOGENOUS SUBSTANCES: ICD-10-CM

## 2018-08-06 PROCEDURE — 99214 OFFICE O/P EST MOD 30 MIN: CPT | Performed by: INTERNAL MEDICINE

## 2018-08-06 ASSESSMENT — PAIN SCALES - GENERAL: PAINLEVEL: NO PAIN (0)

## 2018-08-06 NOTE — LETTER
8/6/2018         RE: Paulie Mcdonough  [address]  [facility] [address]        Dear Colleague,    Thank you for referring your patient, Paulie Mcdonough, to the [facility] [[email or web address] Please see a copy of my visit note below.    [facility]  FOLLOW-UP VISIT NOTE  Date of visit: Aug 6, 2018       Reason for Visit: follow-up SCC BOT, completed concurrent chemoradiation therapy with HD cisplatin 8/2013    Chemotherapy 6/24/2013 7/15/2013 8/5/2013   Day, Cycle Day 1, Cycle 1 Day 22, Cycle 1 Day 43, Cycle 1   CISplatin (PLATINOL)  mg 230 mg 230 mg     HPI: Mr. 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 He began having a sore throat, increasing expectoration, and eventually started having blood in his sputum. He started having coughing spells, and had a particularly bad episode on Memorial Day weekend. He presented to the Emergency Department where he was evaluated with imaging and was notified that he might have cancer. He was referred to an ear, nose and throat specialist locally in [place]. His ENT surgeon in [place] did confirm that he did have cancer, and referred him to the [facility] where he was seen by Dr. Hernandez on 06/06. At this visit, he did express shortness of breath, which was progressively getting worse, and difficulty managing his secretions, especially during the night. He used to keep the head end of the bed elevated and had some noisy breathing. Dr. Hernandez did perform a flexible fiberoptic direct laryngoscopy where a large mass occupying greater than 50% of the oropharyngeal airway was noted, which seemed to be arising from the base of the tongue. It did fill the vallecula and extended along the lateral pharyngeal wall into the hypopharynx and supraglottis. Biopsy of right tongue base revealed poorly differentiated squamous cell carcinoma. The mass  seemed to be obstructing the airway, and, although the patient was not in any immediate airway distress, it was recommended that he undergo tracheostomy to secure the airway. He was admitted for this on 06/07.. Thoracic Surgery was consulted to get a port and a PEG placed.     PET/CT done 6/10/13 showed no lymphadenopathy in neck and no metastatic disease.  He presented to the ED 6/13/13 with severe coughing episode and hematemesis, as well as coffee-ground drainage from PET tube. He had an embolization to the tumor done at [place] 6/14. He was seen in Medical Oncology on 6/14/13 with plan to proceed with definitive concurrent chemoradiation with cisplatin every 3 weeks and daily radiation with a curative intent. He presented again to the ED 6/18/13 with hematemesis for over 2 hours and melena and admitted briefly overnight on observation. ENT concerned to do another embolization or surgery due to tumor friability and if patient required neck dissection in future. Discharged 6/19/13 with hopes that starting treatment will control the bleeding. He started HD cisplatin and daily radiation 6/24/13. He did exceptionally well throughout concurrent therapy until the last few days of XRT he was admitted due to throat pain, managing secretions and difficulty with PEG tolerability.    Interval History:   Mr. Mcdonough is doing well.     Paulie is doing well except for having fatigue. He notes that he has joined a part time job and is working more than many do in a full time position. This leaves him tired.     ROS:  No other f/s/c. No chest pain/dyspnea.  Daily BM, no black/bloody stools. No urinary issues. No neuropathy.  Mood is good.    Current Outpatient Prescriptions   Medication     Cholecalciferol (D3 ADULT PO)     Cyanocobalamin (VITAMIN B 12 PO)     Cyclobenzaprine HCl (FLEXERIL PO)     DOXAZOSIN MESYLATE PO     finasteride (PROSCAR) 5 MG tablet     FLUZONE HIGH-DOSE 0.5 ML injection     levothyroxine  (SYNTHROID/LEVOTHROID) 100 MCG tablet     Levothyroxine Sodium (SYNTHROID PO)     loratadine (CLARITIN) 10 MG tablet     Multiple Vitamins-Minerals (MULTIVITAMIN ADULT PO)     OMEPRAZOLE PO     prednisoLONE acetate (PRED FORTE) 1 % ophthalmic susp     No current facility-administered medications for this visit.        PHYSICAL EXAM:  There were no vitals taken for this visit.  Wt Readings from Last 4 Encounters:   02/26/18 104.3 kg (230 lb)   08/21/17 107.5 kg (237 lb)   04/03/17 109.3 kg (241 lb)   02/20/17 105.2 kg (232 lb)       General: Alert, oriented, pleasant, NAD.   Skin: No rashes on exposed skin  HEENT: Normocephalic, atraumatic, PERRLA, EOMI. No mucositis, exposed bone or thrush  Neck: No cervical or supraclavicular LAD.   Lungs: CTA bilaterally, normal work of breathing  Cardiac: RRR, S1, S2, no murmurs  Abdomen: Soft, nontender, nondistended. Normoactive bowel sounds. No hepatosplenomegaly.  Neuro: CNII-XII grossly intact    Labs:   Recent Labs   Lab Test  08/21/17   1206  06/12/17   1115  02/20/17   1323  08/08/16   0838  02/08/16   0947   NA  139  142  140  140  142   POTASSIUM  4.0  4.1  4.4  4.0  4.2   CHLORIDE  108  109  108  108  109   CO2  24  26  25  27  27   ANIONGAP  7  7  7  5  6   BUN  19  22  18  15  18   CR  0.70  1.00  1.02  0.97  0.90   GLC  103*  96  96  101*  101*   TINO  9.0  8.8  9.2  8.6  8.3*     Recent Labs   Lab Test  09/26/13   0755  08/29/13   1113  08/22/13   0940  08/20/13   0634  08/19/13   0733  08/16/13   0608  08/15/13   0749   08/14/13   0546   MAG  2.1  2.1  1.7  1.8  1.6  1.7  1.8   < >  1.5*   PHOS   --    --   3.3   --   3.0  3.2  2.7   --   2.8    < > = values in this interval not displayed.     Recent Labs   Lab Test  08/21/17   1206  06/12/17   1115  02/20/17   1323  08/08/16   0838  02/08/16   0947   WBC  5.7  6.8  7.9  6.9  6.2   HGB  15.4  15.7  15.3  15.9  15.0   PLT  208  222  235  216  199   MCV  89  89  88  89  89   NEUTROPHIL  63.6  64.4  70.6  67.1  75.0      Recent Labs   Lab Test  08/21/17   1206  06/12/17   1115  02/20/17   1323   02/08/16   0947   04/13/15   1301   BILITOTAL  1.4*  0.9  1.0   < >  0.8   < >  1.0   ALKPHOS  85  95  76   < >  79   < >  91   ALT  38  37  32   < >  30   < >  32   AST  20  20  17   < >  22   < >  18   ALBUMIN  3.9  4.0  3.8   < >  3.9   < >  3.9   LDH  182   --    --    --   190   --   172    < > = values in this interval not displayed.     TSH   Date Value Ref Range Status   08/21/2017 1.67 0.40 - 4.00 mU/L Final   06/12/2017 4.28 (H) 0.40 - 4.00 mU/L Final   02/20/2017 2.58 0.40 - 4.00 mU/L Final     No results for input(s): CEA in the last 84535 hours.  Results for orders placed or performed during the hospital encounter of 06/29/18   CT Chest w Contrast    Narrative    CT CHEST WITH CONTRAST   6/29/2018 9:34 AM     HISTORY: Squamous cell cancer of tongue (H). Hypothyroidism due to  non-medication exogenous substances.    TECHNIQUE: 100 mL Isovue 370 total given between ST neck and Chest  CTs. Radiation dose for this scan was reduced using automated exposure  control, adjustment of the mA and/or kV according to patient size, or  iterative reconstruction technique.    COMPARISON: 8/14/2017    FINDINGS: No pulmonary nodules or masses are seen. There are no focal  infiltrates. No pleural effusion or pericardial effusion. Small nodule  in the right lobe of the thyroid is unchanged. There is no  mediastinal, hilar, or axillary lymphadenopathy. Imaging through the  upper abdomen demonstrates a large left renal cyst. There is  multilevel degenerative change in the spine.      Impression    IMPRESSION: Stable chest CT. No evidence of metastatic disease.    RESHMA SAMUEL MD     CT SCAN OF THE NECK WITH CONTRAST  6/29/2018 9:33 AM      HISTORY: Squamous cell cancer of tongue.     TECHNIQUE: Axial CT images of the neck following administration of  intravenous contrast with reformations. Radiation dose for this scan  was reduced using  automated exposure control, adjustment of the mA  and/or kV according to patient size, or iterative reconstruction  technique. 100 mL Isovue 370 total between ST neck and Chest CTs.      COMPARISON: Multiple prior comparisons, the most recent neck CT  8/14/2017.      FINDINGS: Posttreatment changes are again seen within the neck,  particularly on the right. There is persistent loss of fat planes in  the right neck and mild fat stranding. No new suspicious masses are  appreciated.     No new suspicious enlarged or necrotic cervical lymph nodes.     Submandibular glands remain atrophic compatible with radiation  changes. Parotid glands are unremarkable. 3 mm hypodensity in the  right thyroid gland is unchanged since prior and does not require  further followup.     There is heterogeneous sclerosis in the mandible, right greater than  left. Marked periapical lucency surrounding the remaining mandibular  teeth.     Mild polypoid mucosal thickening along the inferior maxillary sinuses  bilaterally.     Chronic fracture deformity of the posterior T1 spinous process.  Degenerative changes in the spine, particularly at C5-C6 and C6-C7. No  destructive osseous lesion appreciated.     Please see dedicated chest CT for details below the neck.         IMPRESSION:     1. Posttreatment changes in the neck again seen. No evidence of  recurrent suspicious mass or lymph node in the neck.  2. Lytic and sclerotic changes in the mandible, particularly on the  right, with periapical lucency of the remaining right mandibular  teeth. 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     JAKE AYON MD             Assessment & Plan:   1. 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 ECOG PS 0  4. NO significant medical comorbidities  5. restaging CT scan do not show evidence of disease  6. Hearing loss on chemoradiation  7. HTN and weight  gain; fatigue and shortness of breath    We had a lengthy discussion with Paulie about his recovery and now is nearly 5 years from completion of therapy and clinically there is no evidence of recurrence. I have reviewed his CT scan and there is no evidence of disease. Reassured that there is no evidence of cancer recurrence by today's labs, physical exam or imaging studies. He would not benefit from any further scheduled imaging scans. I would not schedule any follow up visits or scans for him.     He has lytic and sclerotic changes in right mandible. I have urged him to bring this up with his dentist. He does not have exposed bone at this time. He does not have any pain. He has been following closely with his dentist and needs some additional teeth pulled.     He has not been strict with his follow up in ENT.  He has to drive a long distance for visit and was not enthusiastic about the visit. He has requested me to cancel the visit. I will update Dr. Jeff.     He is euthyroid.     Over 25 min of direct face to face time spent with patient with more than 50% time spent in counseling and coordinating care.          Again, thank you for allowing me to participate in the care of your patient.        Sincerely,        Jovi Mueller MD

## 2018-08-06 NOTE — NURSING NOTE
"Oncology Rooming Note    August 6, 2018 2:05 PM   Paulie Mcdonough is a 73 year old male who presents for:    Chief Complaint   Patient presents with     Oncology Clinic Visit     1 year follow up     Initial Vitals: /90  Pulse 54  Temp 97.7  F (36.5  C)  Resp 16  Ht 1.854 m (6' 0.99\")  Wt 104.3 kg (230 lb)  SpO2 100%  BMI 30.35 kg/m2 Estimated body mass index is 30.35 kg/(m^2) as calculated from the following:    Height as of this encounter: 1.854 m (6' 0.99\").    Weight as of this encounter: 104.3 kg (230 lb). Body surface area is 2.32 meters squared.  No Pain (0) Comment: Data Unavailable   No LMP for male patient.  Allergies reviewed: Yes  Medications reviewed: Yes    Medications: Medication refills not needed today.  Pharmacy name entered into Flip[employer]:    "[id number] - [place], [place] - [address]  ( [facility])  [facility] - [place], [place] - [address]  [id number] - [place], [place] - [address] [place] [address]        5 minutes for nursing intake (face to face time)     Era Faria LPN              "

## 2018-08-06 NOTE — MR AVS SNAPSHOT
After Visit Summary   8/6/2018    Paulie Mcdonough    MRN: [id number]           Patient Information     Date Of Birth          1945        Visit Information        Provider Department      8/6/2018 1:30 PM Jovi Mueller MD [facility]        Today's Diagnoses     Squamous cell cancer of tongue (H)    -  1    Hypothyroidism due to non-medication exogenous substances           Follow-ups after your visit        Your next 10 appointments already scheduled     Aug 13, 2018 11:45 AM CDT   (Arrive by 11:30 AM)   RETURN TUMOR VISIT with Almas Jeff MD   [facility] ([facility])    [address]  4th Floor  [facility] [address] [phone]              Who to contact     If you have questions or need follow up information about today's clinic visit or your schedule please contact [facility] directly at [phone].  Normal or non-critical lab and imaging results will be communicated to you by [employer], letter or phone within 4 business days after the clinic has received the results. If you do not hear from us within 7 days, please contact the clinic through [employer] or phone. If you have a critical or abnormal lab result, we will notify you by phone as soon as possible.  Submit refill requests through [employer] or call your pharmacy and they will forward the refill request to us. Please allow 3 business days for your refill to be completed.          Additional Information About Your Visit        [employer] Information     [employer] gives you secure access to your electronic health record. If you see a primary care provider, you can also send messages to your care team and make appointments. If you have questions, please call your primary care clinic.  If you do not have a primary care provider, please call [phone] and they will assist you.      [employer] is an electronic gateway that provides easy, online  "access to your medical records. With [employer], you can request a clinic appointment, read your test results, renew a prescription or communicate with your care team.     To access your existing account, please contact your [facility] or call [phone] for assistance.        Care EveryWhere ID     This is your Care EveryWhere ID. This could be used by other organizations to access your [facility] medical records  [id number]        Your Vitals Were     Pulse Temperature Respirations Height Pulse Oximetry BMI (Body Mass Index)    54 97.7  F (36.5  C) 16 1.854 m (6' 0.99\") 100% 30.35 kg/m2       Blood Pressure from Last 3 Encounters:   08/06/18 149/90   08/21/17 130/78   02/20/17 124/73    Weight from Last 3 Encounters:   08/06/18 104.3 kg (230 lb)   02/26/18 104.3 kg (230 lb)   08/21/17 107.5 kg (237 lb)              Today, you had the following     No orders found for display       Primary Care Provider Office Phone # Fax #    [facility] [phone]       [address]  [facility] [address]        Equal Access to Services     LISA SABA : Hadii asaf ku hadasho Soomaali, waaxda luqadaha, qaybta kaalmada adeegyada, polina ramirez. So [facility] [phone].    ATENCIÓN: Si habla español, tiene a mosley disposición servicios gratuitos de asistencia lingüística. Llame al [phone].    We comply with applicable federal civil rights laws and [place] laws. We do not discriminate on the basis of race, color, national origin, age, disability, sex, sexual orientation, or gender identity.            Thank you!     Thank you for choosing [facility]  for your care. Our goal is always to provide you with excellent care. Hearing back from our patients is one way we can continue to improve our services. Please take a few minutes to complete the written survey that you may receive in the mail after your visit with us. " Thank you!             Your Updated Medication List - Protect others around you: Learn how to safely use, store and throw away your medicines at www.disposemymeds.org.          This list is accurate as of 8/6/18  6:29 PM.  Always use your most recent med list.                   Brand Name Dispense Instructions for use Diagnosis    D3 ADULT PO           DOXAZOSIN MESYLATE PO      Take 2 mg by mouth daily    Malignant neoplasm of base of tongue (H), Hypothyroidism due to non-medication exogenous substances       finasteride 5 MG tablet    PROSCAR     Take 5 mg by mouth Reported on 4/3/2017        FLEXERIL PO      Take 10 mg by mouth        FLUZONE HIGH-DOSE 0.5 ML injection   Generic drug:  influenza Vac Split High-Dose      Reported on 4/3/2017        loratadine 10 MG tablet    CLARITIN     Take 10 mg by mouth daily Reported on 4/3/2017        MULTIVITAMIN ADULT PO           OMEPRAZOLE PO      Take 20 mg by mouth daily Reported on 4/3/2017    Malignant neoplasm of base of tongue (H), Hypothyroidism       prednisoLONE acetate 1 % ophthalmic susp    PRED FORTE     1 drop 4 times daily        * SYNTHROID PO      Take 50 mcg by mouth        * levothyroxine 100 MCG tablet    SYNTHROID/LEVOTHROID    90 tablet    TAKE 1 TABLET (100 MCG) BY MOUTH DAILY    Hypothyroidism       VITAMIN B 12 PO      Take 500 mcg by mouth        * Notice:  This list has 2 medication(s) that are the same as other medications prescribed for you. Read the directions carefully, and ask your doctor or other care provider to review them with you.

## 2018-08-06 NOTE — PROGRESS NOTES
[facility]  FOLLOW-UP VISIT NOTE  Date of visit: Aug 6, 2018       Reason for Visit: follow-up SCC BOT, completed concurrent chemoradiation therapy with HD cisplatin 8/2013    Chemotherapy 6/24/2013 7/15/2013 8/5/2013   Day, Cycle Day 1, Cycle 1 Day 22, Cycle 1 Day 43, Cycle 1   CISplatin (PLATINOL)  mg 230 mg 230 mg     HPI: Mr. 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 He began having a sore throat, increasing expectoration, and eventually started having blood in his sputum. He started having coughing spells, and had a particularly bad episode on Memorial Day weekend. He presented to the Emergency Department where he was evaluated with imaging and was notified that he might have cancer. He was referred to an ear, nose and throat specialist locally in [place]. His ENT surgeon in [place] did confirm that he did have cancer, and referred him to the [facility] where he was seen by Dr. Hernandez on 06/06. At this visit, he did express shortness of breath, which was progressively getting worse, and difficulty managing his secretions, especially during the night. He used to keep the head end of the bed elevated and had some noisy breathing. Dr. Hernandez did perform a flexible fiberoptic direct laryngoscopy where a large mass occupying greater than 50% of the oropharyngeal airway was noted, which seemed to be arising from the base of the tongue. It did fill the vallecula and extended along the lateral pharyngeal wall into the hypopharynx and supraglottis. Biopsy of right tongue base revealed poorly differentiated squamous cell carcinoma. The mass seemed to be obstructing the airway, and, although the patient was not in any immediate airway distress, it was recommended that he undergo tracheostomy to secure the airway. He was admitted for this on 06/07.. Thoracic Surgery  was consulted to get a port and a PEG placed.     PET/CT done 6/10/13 showed no lymphadenopathy in neck and no metastatic disease.  He presented to the ED 6/13/13 with severe coughing episode and hematemesis, as well as coffee-ground drainage from PET tube. He had an embolization to the tumor done at [place] 6/14. He was seen in Medical Oncology on 6/14/13 with plan to proceed with definitive concurrent chemoradiation with cisplatin every 3 weeks and daily radiation with a curative intent. He presented again to the ED 6/18/13 with hematemesis for over 2 hours and melena and admitted briefly overnight on observation. ENT concerned to do another embolization or surgery due to tumor friability and if patient required neck dissection in future. Discharged 6/19/13 with hopes that starting treatment will control the bleeding. He started HD cisplatin and daily radiation 6/24/13. He did exceptionally well throughout concurrent therapy until the last few days of XRT he was admitted due to throat pain, managing secretions and difficulty with PEG tolerability.    Interval History:   Mr. Mcdonough is doing well.     Paulie is doing well except for having fatigue. He notes that he has joined a part time job and is working more than many do in a full time position. This leaves him tired.     ROS:  No other f/s/c. No chest pain/dyspnea.  Daily BM, no black/bloody stools. No urinary issues. No neuropathy.  Mood is good.    Current Outpatient Prescriptions   Medication     Cholecalciferol (D3 ADULT PO)     Cyanocobalamin (VITAMIN B 12 PO)     Cyclobenzaprine HCl (FLEXERIL PO)     DOXAZOSIN MESYLATE PO     finasteride (PROSCAR) 5 MG tablet     FLUZONE HIGH-DOSE 0.5 ML injection     levothyroxine (SYNTHROID/LEVOTHROID) 100 MCG tablet     Levothyroxine Sodium (SYNTHROID PO)     loratadine (CLARITIN) 10 MG tablet     Multiple Vitamins-Minerals (MULTIVITAMIN ADULT PO)     OMEPRAZOLE PO     prednisoLONE acetate (PRED FORTE) 1 % ophthalmic susp      No current facility-administered medications for this visit.        PHYSICAL EXAM:  There were no vitals taken for this visit.  Wt Readings from Last 4 Encounters:   02/26/18 104.3 kg (230 lb)   08/21/17 107.5 kg (237 lb)   04/03/17 109.3 kg (241 lb)   02/20/17 105.2 kg (232 lb)       General: Alert, oriented, pleasant, NAD.   Skin: No rashes on exposed skin  HEENT: Normocephalic, atraumatic, PERRLA, EOMI. No mucositis, exposed bone or thrush  Neck: No cervical or supraclavicular LAD.   Lungs: CTA bilaterally, normal work of breathing  Cardiac: RRR, S1, S2, no murmurs  Abdomen: Soft, nontender, nondistended. Normoactive bowel sounds. No hepatosplenomegaly.  Neuro: CNII-XII grossly intact    Labs:   Recent Labs   Lab Test  08/21/17   1206  06/12/17   1115  02/20/17   1323  08/08/16   0838  02/08/16   0947   NA  139  142  140  140  142   POTASSIUM  4.0  4.1  4.4  4.0  4.2   CHLORIDE  108  109  108  108  109   CO2  24  26  25  27  27   ANIONGAP  7  7  7  5  6   BUN  19  22  18  15  18   CR  0.70  1.00  1.02  0.97  0.90   GLC  103*  96  96  101*  101*   TINO  9.0  8.8  9.2  8.6  8.3*     Recent Labs   Lab Test  09/26/13   0755  08/29/13   1113  08/22/13   0940  08/20/13   0634  08/19/13   0733  08/16/13   0608  08/15/13   0749   08/14/13   0546   MAG  2.1  2.1  1.7  1.8  1.6  1.7  1.8   < >  1.5*   PHOS   --    --   3.3   --   3.0  3.2  2.7   --   2.8    < > = values in this interval not displayed.     Recent Labs   Lab Test  08/21/17   1206  06/12/17   1115  02/20/17   1323  08/08/16   0838  02/08/16   0947   WBC  5.7  6.8  7.9  6.9  6.2   HGB  15.4  15.7  15.3  15.9  15.0   PLT  208  222  235  216  199   MCV  89  89  88  89  89   NEUTROPHIL  63.6  64.4  70.6  67.1  75.0     Recent Labs   Lab Test  08/21/17   1206  06/12/17   1115  02/20/17   1323   02/08/16   0947   04/13/15   1301   BILITOTAL  1.4*  0.9  1.0   < >  0.8   < >  1.0   ALKPHOS  85  95  76   < >  79   < >  91   ALT  38  37  32   < >  30   < >  32   AST   20  20  17   < >  22   < >  18   ALBUMIN  3.9  4.0  3.8   < >  3.9   < >  3.9   LDH  182   --    --    --   190   --   172    < > = values in this interval not displayed.     TSH   Date Value Ref Range Status   08/21/2017 1.67 0.40 - 4.00 mU/L Final   06/12/2017 4.28 (H) 0.40 - 4.00 mU/L Final   02/20/2017 2.58 0.40 - 4.00 mU/L Final     No results for input(s): CEA in the last 57630 hours.  Results for orders placed or performed during the hospital encounter of 06/29/18   CT Chest w Contrast    Narrative    CT CHEST WITH CONTRAST   6/29/2018 9:34 AM     HISTORY: Squamous cell cancer of tongue (H). Hypothyroidism due to  non-medication exogenous substances.    TECHNIQUE: 100 mL Isovue 370 total given between ST neck and Chest  CTs. Radiation dose for this scan was reduced using automated exposure  control, adjustment of the mA and/or kV according to patient size, or  iterative reconstruction technique.    COMPARISON: 8/14/2017    FINDINGS: No pulmonary nodules or masses are seen. There are no focal  infiltrates. No pleural effusion or pericardial effusion. Small nodule  in the right lobe of the thyroid is unchanged. There is no  mediastinal, hilar, or axillary lymphadenopathy. Imaging through the  upper abdomen demonstrates a large left renal cyst. There is  multilevel degenerative change in the spine.      Impression    IMPRESSION: Stable chest CT. No evidence of metastatic disease.    RESHMA SAMUEL MD     CT SCAN OF THE NECK WITH CONTRAST  6/29/2018 9:33 AM      HISTORY: Squamous cell cancer of tongue.     TECHNIQUE: Axial CT images of the neck following administration of  intravenous contrast with reformations. Radiation dose for this scan  was reduced using automated exposure control, adjustment of the mA  and/or kV according to patient size, or iterative reconstruction  technique. 100 mL Isovue 370 total between ST neck and Chest CTs.      COMPARISON: Multiple prior comparisons, the most recent neck  CT  8/14/2017.      FINDINGS: Posttreatment changes are again seen within the neck,  particularly on the right. There is persistent loss of fat planes in  the right neck and mild fat stranding. No new suspicious masses are  appreciated.     No new suspicious enlarged or necrotic cervical lymph nodes.     Submandibular glands remain atrophic compatible with radiation  changes. Parotid glands are unremarkable. 3 mm hypodensity in the  right thyroid gland is unchanged since prior and does not require  further followup.     There is heterogeneous sclerosis in the mandible, right greater than  left. Marked periapical lucency surrounding the remaining mandibular  teeth.     Mild polypoid mucosal thickening along the inferior maxillary sinuses  bilaterally.     Chronic fracture deformity of the posterior T1 spinous process.  Degenerative changes in the spine, particularly at C5-C6 and C6-C7. No  destructive osseous lesion appreciated.     Please see dedicated chest CT for details below the neck.         IMPRESSION:     1. Posttreatment changes in the neck again seen. No evidence of  recurrent suspicious mass or lymph node in the neck.  2. Lytic and sclerotic changes in the mandible, particularly on the  right, with periapical lucency of the remaining right mandibular  teeth. 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     JAKE AYON MD             Assessment & Plan:   1. 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 ECOG PS 0  4. NO significant medical comorbidities  5. restaging CT scan do not show evidence of disease  6. Hearing loss on chemoradiation  7. HTN and weight gain; fatigue and shortness of breath    We had a lengthy discussion with Paulie about his recovery and now is nearly 5 years from completion of therapy and clinically there is no evidence of recurrence. I have reviewed his CT scan and there is no  evidence of disease. Reassured that there is no evidence of cancer recurrence by today's labs, physical exam or imaging studies. He would not benefit from any further scheduled imaging scans. I would not schedule any follow up visits or scans for him.     He has lytic and sclerotic changes in right mandible. I have urged him to bring this up with his dentist. He does not have exposed bone at this time. He does not have any pain. He has been following closely with his dentist and needs some additional teeth pulled.     He has not been strict with his follow up in ENT.  He has to drive a long distance for visit and was not enthusiastic about the visit. He has requested me to cancel the visit. I will update Dr. Jeff.     He is euthyroid.     Over 25 min of direct face to face time spent with patient with more than 50% time spent in counseling and coordinating care.

## 2018-08-24 ENCOUNTER — TELEPHONE (OUTPATIENT)
Dept: OTOLARYNGOLOGY | Facility: CLINIC | Age: 73
End: 2018-08-24

## 2018-08-24 NOTE — TELEPHONE ENCOUNTER
SUBJECTIVE:                                                    Char Newman is a 57 year old female who presents to clinic today for the following health issues:    Rash      Duration: 2 years     Description  Location: Legs, abd, arms  Itching: no    Intensity:  mild    Accompanying signs and symptoms: None    History (similar episodes/previous evaluation): None    Precipitating or alleviating factors:  New exposures:  None  Recent travel: no      Therapies tried and outcome: Eczema cream OTC  Has seen dermatology in the past and was treated for eczema   Rash on her legs and feet are not consistent with her eczema   Violet looking rash on dorsum of both feet which is flat     URINARY TRACT SYMPTOMS      Duration: 2 weeks    Description  dysuria, frequency, urgency, hematuria, nocturia x 3, hesitancy, back pain and went to bathroom yesterday and unable to go then the urine gushed out with pain and blood.    Intensity:  moderate    Accompanying signs and symptoms:  Fever/chills: no   Flank pain YES  Nausea and vomiting: no   Vaginal symptoms: none  Abdominal/Pelvic Pain: no     History  History of frequent UTI's: no   History of kidney stones: no   Sexually Active: no   Possibility of pregnancy: No    Precipitating or alleviating factors: None    Therapies tried and outcome: none   Outcome: na    Burning frequency and blood in urine 2 weeks and then it faded away and would   Feels cramping and low back pain  Yesterday went to the bathroom and then something gave way and urinated and had pain and blood as well  Has had a hysterectomy in the past for heavy menses and cysts.       Also complains of achey and restless at bedtime but that has been going for years.   Father has rheumatoid. Has morning stiffness or if sits for a while has a hard time getting back up   Has inserts for shoes which help some but is always is achey  No joint swelling or warmth       Problem list and histories reviewed & adjusted, as  [facility] Call Center    Phone Message    May a detailed message be left on voicemail: yes    Reason for Call: Other: Patient is calling to return a call from Tammy, he is stating that he did not miss the [place] appt on 08/16.  When he was in [place] 08/06 to see Dr Tanja Mueller he stated he was told he did not need that appt and supposedly canceled the appt for the patinet.  The patient is a touch upset that his son was called, and that he is not avoiding the physician he stated he was told he did not need the appt.  Please fell free to call the patient next week to follow up.       Action Taken: Message routed to:  [facility] ([facility] ENT   indicated.  Additional history: as documented    Patient Active Problem List   Diagnosis     Allergic rhinitis     Anxiety state     HYPERLIPIDEMIA LDL GOAL <160     Urinary incontinence     Acne     Major depression in complete remission (H)     Family history of pancreatic cancer     Family history of breast cancer     History of LAV     Past Surgical History:   Procedure Laterality Date     BIOPSY  Several    Uterus     C LIGATE FALLOPIAN TUBE       COLONOSCOPY  2/17/2014    Procedure: COLONOSCOPY;  Colon cancer screening        HYSTEROSCOPY, SURGICAL; W/ ENDOMETRIAL ABLATION, ANY METHOD  11/09/07     HYSTEROSCOPY  07/28/06     LAPAROSCOPIC ASSISTED HYSTERECTOMY VAGINAL, BILATERAL SALPINGO-OOPHORECTOMY, COMBINED  11/27/2013    [facility]     LAPAROSCOPIC ASSISTED HYSTERECTOMY VAGINAL, BILATERAL SALPINGO-OOPHORECTOMY, COMBINED  11/27/13    [facility]     oblation         Social History   Substance Use Topics     Smoking status: Never Smoker     Smokeless tobacco: Never Used      Comment: N/A     Alcohol use No     Family History   Problem Relation Age of Onset     CANCER Paternal Grandmother      Skin & Breast     CANCER Father      Skin and throat cancer     Hypertension Mother      CANCER Paternal Grandfather      Lung     CANCER Mother      pancreatic cancer     DIABETES Maternal Grandfather      N/A     DIABETES Mother      Started after pancreatic cancer surgery     Hypertension Maternal Grandmother      CEREBROVASCULAR DISEASE Paternal Grandmother      Breast Cancer Maternal Grandmother      Breast Cancer Paternal Grandmother          Current Outpatient Prescriptions   Medication Sig Dispense Refill     venlafaxine (EFFEXOR-XR) 150 MG 24 hr capsule Take 1 capsule (150 mg) by mouth daily 30 capsule 5     triamcinolone (KENALOG) 0.1 % cream Apply sparingly to affected area 2 times daily as needed 80 g 1     acetaminophen (TYLENOL) 500 MG tablet Take 500-1,000 mg by mouth every 6 hours as  needed For cramps       Allergies   Allergen Reactions     Penicillins Hives     Sulfa Drugs Hives     Recent Labs   Lab Test  01/15/16   0754  11/14/13   1638  12/16/11   0913  11/19/10   0934   LDL  110*   --   113  99   HDL  52   --   52  47*   TRIG  172*   --   76  149   CR   --   0.66   --   0.68   GFRESTIMATED   --   >90   --   >90   GFRESTBLACK   --   >90   --   >90   POTASSIUM   --   3.7   --   3.8   TSH   --    --   2.47  2.57            Reviewed and updated as needed this visit by clinical staff     Reviewed and updated as needed this visit by Provider         ROS:  CONSTITUTIONAL:NEGATIVE for fever, chills, change in weight  INTEGUMENTARY/SKIN: NEGATIVE for non-healing lesion  and POSITIVE for rash for at least a couple years  ENT/MOUTH: NEGATIVE for ear, mouth and throat problems  RESP:NEGATIVE for significant cough or SOB  CV: POSITIVE for palpitations and NEGATIVE for chest pain/chest pressure, irregular heart beat and lower extremity edema  GI: NEGATIVE for nausea, abdominal pain, heartburn, or change in bowel habits  POSITIVE for dysuria and hematuria and NEGATIVE for Hx kidney stone  MUSCULOSKELETAL: POSITIVE  for joint pain  and joint stiffness  and NEGATIVE for joint swelling  and joint warmth   NEURO: POSITIVE for weakness as no strength  and NEGATIVE for involuntary movements, gait disturbance and loss of consciousness  HEME/ALLERGY/IMMUNE: POSITIVE  for allergies and NEGATIVE for anemia and bleeding disorder  PSYCHIATRIC: NEGATIVE for changes in mood or affect    OBJECTIVE:     /72  Pulse 87  Temp 98.9  F (37.2  C) (Temporal)  Wt 160 lb (72.6 kg)  LMP 10/10/2013  SpO2 98%  BMI 28.34 kg/m2  Body mass index is 28.34 kg/(m^2).  GENERAL APPEARANCE: alert, active and no distress  HENT: ear canals and TM's normal and nose and mouth without ulcers or lesions  NECK: no adenopathy  RESP: lungs clear to auscultation - no rales, rhonchi or wheezes  CV: regular rates and rhythm, no murmur,  click or rub and no irregular beats  ABDOMEN: soft, non-tender  MS: extremities normal- no gross deformities noted  SKIN: no suspicious lesions  Examination of the rash reveals: Eczema:Bilateral arms and legs dry, slightly raised, red patches with mild flaking.  NEURO: Normal strength and tone, mentation intact and speech normal  PSYCH: mentation appears normal and affect normal/bright    Diagnostic Test Results:  Results for orders placed or performed in visit on 08/11/17   UA with Microscopic reflex to Culture (Saumya James; [facility])   Result Value Ref Range    Color Urine Yellow     Appearance Urine Clear     Glucose Urine Negative NEG mg/dL    Bilirubin Urine Negative NEG    Ketones Urine Negative NEG mg/dL    Specific Gravity Urine 1.011 1.003 - 1.035    Blood Urine Negative NEG    pH Urine 6.0 5.0 - 7.0 pH    Protein Albumin Urine Negative NEG mg/dL    Urobilinogen mg/dL Normal 0.0 - 2.0 mg/dL    Nitrite Urine Negative NEG    Leukocyte Esterase Urine Negative NEG    Source Midstream Urine     WBC Urine O - 2  O - 2   0 - 2 /HPF    RBC Urine O - 2  O - 2   0 - 2 /HPF    Squamous Epithelial /LPF Urine Few  Few   FEW /LPF    Mucous Urine Present  Present   (A) NEG /LPF   CBC with platelets   Result Value Ref Range    WBC 5.9 4.0 - 11.0 10e9/L    RBC Count 4.65 3.8 - 5.2 10e12/L    Hemoglobin 13.6 11.7 - 15.7 g/dL    Hematocrit 40.6 35.0 - 47.0 %    MCV 87 78 - 100 fl    MCH 29.2 26.5 - 33.0 pg    MCHC 33.5 31.5 - 36.5 g/dL    RDW 14.0 10.0 - 15.0 %    Platelet Count 256 150 - 450 10e9/L   CRP inflammation   Result Value Ref Range    CRP Inflammation <2.9 0.0 - 8.0 mg/L   Erythrocyte sedimentation rate auto   Result Value Ref Range    Sed Rate 8 0 - 30 mm/h   Rheumatoid factor   Result Value Ref Range    Rheumatoid Factor <20 <20 IU/mL   Cyclic Citrullinated Peptide Antibody IgG   Result Value Ref Range    Cyclic Citrullinated Peptide Antibody, IgG 3 <7 U/mL   Antinuclear antibody screen by EIA   Result  Value Ref Range    KATRINA Screen by EIA <1.0  Interpretation:  Negative   <1.0   CK total   Result Value Ref Range    CK Total 79 30 - 225 U/L   TSH with free T4 reflex   Result Value Ref Range    TSH 2.76 0.40 - 4.00 mU/L   Urine Culture Aerobic Bacterial   Result Value Ref Range    Specimen Description Midstream Urine     Culture Micro       <10,000 colonies/mL urogenital artie Susceptibility testing not routinely done    Micro Report Status FINAL 08/12/2017      Recent Results (from the past 744 hour(s))   XR Hand Bilateral 2 Views    Narrative    2 views bilateral hand radiographs 8/11/2017 11:13 AM    History: Pain in unspecified joint    Additional history obtained from EMR: Pain and stiffness in hands,  family history of rheumatoid arthritis, negative serologies.    Comparison: X-ray right wrist reason 3/31/2005    Findings:    PA, oblique and lateral view(s) of the right  hand(s) were obtained.     Left:    No acute osseous abnormality. No erosion. A bone island is seen in the  left fourth distal phalanx.    Bones are osteopenic in appearance. No substantial degenerative  changes.    Soft tissue is unremarkable.    Right:    No acute osseous abnormality.  No erosion.    An old fracture deformity of the distal fifth metacarpal shaft is  seen.     The bones are osteopenic in appearance. No substantial degenerative  changes.    Soft tissue is unremarkable.      Impression    Impression:  1. No radiographic evidence of inflammatory arthritis.  2. No substantial degenerative change.  3. Diffuse osteopenia.    I have personally reviewed the examination and initial interpretation  and I agree with the findings.    [facility]   CT Abdomen Pelvis w/o & w Contrast    Narrative    EXAMINATION: CT ABDOMEN PELVIS W/O & W CONTRAST, 8/11/2017 11:19  AM    TECHNIQUE:  Helical CT images from the lung bases through the  symphysis pubis were obtained  without and with IV contrast. Contrast  dose: ISOVUE 370 98  ML    COMPARISON: None    HISTORY: Hematuria, unspecified    FINDINGS:  Liver: Hepatic steatosis.  Bile ducts: No biliary ductal dilatation.   Gallbladder: Gallbladder is not distended. No pericholecystic fluid.  No calcified gallstone.   Pancreas: No pancreatic ductal dilatation. No suspicious lesion.  Normal appearance of the pancreas.    Spleen: Spleen appears unremarkable and normal size.   Adrenals: Adrenals are unremarkable.   Kidneys: There are tiny punctate right renal stones, largest measuring  1 to 2 mm in the lower pole on series 3 image 80. No definite  left-sided stone disease.   Bowel: Normal caliber. Minimal diverticulosis without evidence of  diverticulitis. Small hiatal hernia. Normal appendix  Mesenteric lymph nodes: No enlarged mesenteric lymph nodes.   Peritoneum: No ascites or free air; no fluid collection.   Retroperitoneum: Unremarkable.  Vessels: Patent major abdominal vessels  Lung Bases: Clear    PELVIS:    Reproductive organs: No pelvic masses. Changes of hysterectomy.  Bladder: Bladder is partially distended and appears grossly  unremarkable.     BONES: Moderate degenerative changes of the spine. No suspicious  osseous lesion.       Impression    IMPRESSION:   1. Punctate right renal stones without obstruction.  2. No urothelial masses or filling defects.  3. Minimal diverticulosis without diverticulitis, small hiatal hernia,  stable changes of hysterectomy.    I have personally reviewed the examination and initial interpretation  and I agree with the findings.    SOTO WRIGHT MD       ASSESSMENT/PLAN:     Char was seen today for uti and derm problem.    Diagnoses and all orders for this visit:    Dysuria  -     UA with Microscopic reflex to Culture (Saumya James; [facility])  -     UROLOGY ADULT REFERRAL  -     Urine Culture Aerobic Bacterial    Hematuria  -     UROLOGY ADULT REFERRAL  -     CT Abdomen Pelvis w/o & w Contrast; Future    Pain in joint, multiple sites  -      CBC with platelets  -     CRP inflammation  -     Erythrocyte sedimentation rate auto  -     Rheumatoid factor  -     Cyclic Citrullinated Peptide Antibody IgG  -     Antinuclear antibody screen by EIA  -     CK total  -     XR Hand Bilateral 2 Views; Future    Dermatitis  -     DERMATOLOGY REFERRAL    Screening for thyroid disorder  -     TSH with free T4 reflex    PLAN:    Patient needs to follow up in if no improvement,or sooner if worsening of symptoms or other symptoms develop.  FURTHER TESTING:       - CT ABDOMEN AND PELVIS   CONSULTATION/REFERRAL to Dermatology AND Urology   Will follow up and/or notify patient of  results via My Chart to determine further need for followup  See Patient Instructions    JUNIOR Rausch CNP  M [facility]

## 2018-08-27 NOTE — TELEPHONE ENCOUNTER
"Followed up with patient to notify that ENT did not receive any correspondence to cancel 8/13 appointment which prompted Tammy to call him to reschedule. Patient stated, the voicemail Tammy left implied that he was \"avoiding\" the Dr. ROMERO explained to patient that there was a miscommunication and ENT will reach out to patient when a follow up is needed at this time.   "

## 2018-10-10 DIAGNOSIS — E03.9 HYPOTHYROIDISM: ICD-10-CM

## 2018-10-11 RX ORDER — LEVOTHYROXINE SODIUM 100 UG/1
100 TABLET ORAL DAILY
Qty: 30 TABLET | Refills: 0 | Status: SHIPPED | OUTPATIENT
Start: 2018-10-11

## 2018-10-11 NOTE — TELEPHONE ENCOUNTER
Last Clinic Visit:  2/26/18 (Follow up with Dr. Jeff in 6 months)   NV: NONE   30 DAY RF   OVER DUE MD APPT  Scheduling has been notified to contact the pt FOR MD APPT.

## 2018-10-12 NOTE — TELEPHONE ENCOUNTER
Health Call Center    Phone Message    May a detailed message be left on voicemail: yes    Reason for Call: Other: Patient called back in.  He is now scheduled for 12-3 with Dr Jeff.  He would like his RX sent to [facility] in [place] as he has moved.  Please resend RX to this pharmacy.      Action Taken: Message routed to:  [facility] ([facility] sia

## 2018-12-03 ENCOUNTER — OFFICE VISIT (OUTPATIENT)
Dept: OTOLARYNGOLOGY | Facility: CLINIC | Age: 73
End: 2018-12-03
Payer: MEDICARE

## 2018-12-03 VITALS
BODY MASS INDEX: 28.89 KG/M2 | OXYGEN SATURATION: 96 % | HEART RATE: 57 BPM | SYSTOLIC BLOOD PRESSURE: 144 MMHG | HEIGHT: 73 IN | WEIGHT: 218 LBS | DIASTOLIC BLOOD PRESSURE: 84 MMHG

## 2018-12-03 DIAGNOSIS — C01 MALIGNANT NEOPLASM OF BASE OF TONGUE (H): ICD-10-CM

## 2018-12-03 DIAGNOSIS — C01 SQUAMOUS CELL CARCINOMA OF BASE OF TONGUE (H): Primary | ICD-10-CM

## 2018-12-03 ASSESSMENT — PAIN SCALES - GENERAL: PAINLEVEL: NO PAIN (0)

## 2018-12-03 NOTE — PROGRESS NOTES
December 3, 2018    PRIOR ONCOLOGIC HISTORY:  Mr. Mcdonough is a 73 year old male who is a previous patient of Dr. 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    HISTORY OF PRESENT ILLNESS:  Mr. Mcdonough has returned for follow-up.  He is now 5 years and 4 months out from his chemoradiation treatment.  Patient is doing well he has no complaints today. He denies any hoarseness, unexpected weight loss, referred otalgia, odynophagia, dysphagia, hemoptysis, pain, bleeding, and recurrent lumps or bumps. He last saw his oncologist Dr. Mueller 8/6/2018 who went through his imaging and results at that time.    REVIEW OF SYSTEMS:   ENT ROS 2/26/2018   Constitutional -   Neurology -   Ears, Nose, Throat Nasal congestion or drainage   Cardiopulmonary -   Gastrointestinal/Genitourinary -   Musculoskeletal -   Allergy/Immunology -   Endocrine Heat or cold intolerance   Skin -      10 point ROS neg other than the symptoms noted above in the HPI.    PHYSICAL EXAMINATION:    There were no vitals taken for this visit.  Constitutional:  The patient was unaccompanied, well-groomed, and in no acute distress.     Oral Cavity: Normal tongue, floor of mouth, buccal mucosa, and palate.  No lesions or masses on inspection or palpation.     Oropharynx and Hypopharynx: Normal mucosa, palate symmetric with normal elevation. No abnormal lymph tissue in the oropharynx.  Pterygoid region non-tender. Palpation of the oropharynx, base of tongue, vallecula, and epiglottis revealed no masses or induration.   Neck: Supple with normal laryngeal and tracheal landmarks.  The parotid beds were without masses.  No palpable thyroid.  Normal range of motion   Lymphatic: There is no palpable lymphadenopathy in the neck.      PROCEDURE:   FIBEROPTIC ENDOSCOPY:  Consent for fiberoptic laryngoscopy was obtained, and we confirmed correctness of procedure and  identity of patient.  Fiberoptic laryngoscopy was indicated due to history of right base of tongue cancer.  The nose was topically decongested and anesthetized.  The fiberoptic laryngoscope was passed under endoscopic vision through the right nare.  The turbinates were normal.  The inferior and middle meati were clear bilaterally without purulence, masses, or polyps.  The nasopharynx was clear.  The Eustachian tubes were clear.  The soft palate appeared normal with good mobility.  The epiglottis was sharp and the visualized portion of the vallecula was clear.  The larynx was clear with mobile cords.  The arytenoids were clear and there was no pooling in the hypopharynx. I saw no concern lesions or signs of recurrence.    IMAGING:  CT neck:  IMPRESSION: Stable chest CT. No evidence of metastatic disease.    CT chest:  IMPRESSION:     1. Posttreatment changes in the neck again seen. No evidence of  recurrent suspicious mass or lymph node in the neck.  2. Lytic and sclerotic changes in the mandible, particularly on the  right, with periapical lucency of the remaining right mandibular  teeth. 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    ASSESSMENT AND PLAN:  Paulie Mcdonough is a 73 year old male 5 years and 4 months out from chemoradiation treatment for a T4 N0 M0 squamous cell carcinoma of the right base of tongue.  No evidence of disease at today's visit.  I informed the patient that he is now considered cured as he is 5 years out from his treatment.  He is happy to hear this news.  We did go over his CT scans again today.  I reiterated the importance of letting his dentist and oral surgeon know of his prior radiation treatment to that area and to be very cautious of any dental extractions or procedures as he does exhibit some ORN of the mandible.  Patient states that they are aware of this and that he does have some procedures scheduled in the future, however  they are approaching things conservatively knowing his mandibular bone health.  Patient to follow-up in 1-3 years.    Amanda Madera PA-C  Otolaryngology - Head & Neck Surgery  [phone]      CC:  Almas Jeff MD  Otolaryngology/Head & Neck Surgery  [facility] 396    Jovi Mueller MD  Hematology & Oncology    Samuel Coffey MD   [place] [phone]       Summer Hernandez MD   [facility]

## 2018-12-03 NOTE — NURSING NOTE
"Chief Complaint   Patient presents with     RECHECK     follow up throat cancer      Blood pressure 144/84, pulse 57, height 1.854 m (6' 1\"), weight 98.9 kg (218 lb), SpO2 96 %.    Rafael Viveros LPN    "

## 2018-12-03 NOTE — MR AVS SNAPSHOT
"              After Visit Summary   12/3/2018    Paulie Mcdonough    MRN: [id number]           Patient Information     Date Of Birth          1945        Visit Information        Provider Department      12/3/2018 11:00 AM Amanda Madera PA-C [facility] Ear Nose and Throat        Today's Diagnoses     Squamous cell carcinoma of base of tongue (H)    -  1    MALIG NEOPLASM TONGUE BASE           Follow-ups after your visit        Who to contact     Please call your clinic at [phone] to:    Ask questions about your health    Make or cancel appointments    Discuss your medicines    Learn about your test results    Speak to your doctor            Additional Information About Your Visit        [employer] Information     [employer] gives you secure access to your electronic health record. If you see a primary care provider, you can also send messages to your care team and make appointments. If you have questions, please call your primary care clinic.  If you do not have a primary care provider, please call [phone] and they will assist you.      [employer] is an electronic gateway that provides easy, online access to your medical records. With [employer], you can request a clinic appointment, read your test results, renew a prescription or communicate with your care team.     To access your existing account, please contact your [facility] or call [phone] for assistance.        Care EveryWhere ID     This is your Care EveryWhere ID. This could be used by other organizations to access your [place] medical records  [id number]        Your Vitals Were     Pulse Height Pulse Oximetry BMI (Body Mass Index)          57 1.854 m (6' 1\") 96% 28.76 kg/m2         Blood Pressure from Last 3 Encounters:   12/03/18 144/84   08/06/18 149/90   08/21/17 130/78    Weight from Last 3 Encounters:   12/03/18 98.9 kg (218 lb)   08/06/18 104.3 kg (230 lb)   02/26/18 104.3 kg (230 lb)              We " Performed the Following     IMAGESTREAM RECORDING ORDER     LARYNGOSCOPY FLEX FIBEROPTIC, DIAGNOSTIC        Primary Care Provider Office Phone # Fax #    [facility] [phone] [address]  [facility] [address]        Equal Access to Services     NEVINLUTHER JAYANT : Hadii aad ku hadyanno Soomaali, waaxda luqadaha, qaybta kaalmada adeegyada, waxay idiin haysangitan aderik martinez nicole ramirez. So [facility] [phone].    ATENCIÓN: Si habla español, tiene a mosley disposición servicios gratuitos de asistencia lingüística. Llame al [phone].    We comply with applicable federal civil rights laws and [place] laws. We do not discriminate on the basis of race, color, national origin, age, disability, sex, sexual orientation, or gender identity.            Thank you!     Thank you for choosing [facility] EAR NOSE AND THROAT  for your care. Our goal is always to provide you with excellent care. Hearing back from our patients is one way we can continue to improve our services. Please take a few minutes to complete the written survey that you may receive in the mail after your visit with us. Thank you!             Your Updated Medication List - Protect others around you: Learn how to safely use, store and throw away your medicines at www.disposemymeds.org.          This list is accurate as of 12/3/18  1:55 PM.  Always use your most recent med list.                   Brand Name Dispense Instructions for use Diagnosis    D3 ADULT PO           DOXAZOSIN MESYLATE PO      Take 2 mg by mouth daily    Malignant neoplasm of base of tongue (H), Hypothyroidism due to non-medication exogenous substances       finasteride 5 MG tablet    PROSCAR     Take 5 mg by mouth Reported on 4/3/2017        FLEXERIL PO      Take 10 mg by mouth        FLUZONE HIGH-DOSE 0.5 ML injection   Generic drug:  influenza Vac Split High-Dose      Reported on 4/3/2017        loratadine 10 MG tablet    CLARITIN     Take 10 mg by  mouth daily Reported on 4/3/2017        MULTIVITAMIN ADULT PO           OMEPRAZOLE PO      Take 20 mg by mouth daily Reported on 4/3/2017    Malignant neoplasm of base of tongue (H), Hypothyroidism       prednisoLONE acetate 1 % ophthalmic suspension    PRED FORTE     1 drop 4 times daily        * SYNTHROID PO      Take 50 mcg by mouth        * levothyroxine 100 MCG tablet    SYNTHROID/LEVOTHROID    30 tablet    Take 1 tablet (100 mcg) by mouth daily Call clinic to schedule follow  MD APPT. / LABS (DUE AUG)    Hypothyroidism       VITAMIN B 12 PO      Take 500 mcg by mouth        * Notice:  This list has 2 medication(s) that are the same as other medications prescribed for you. Read the directions carefully, and ask your doctor or other care provider to review them with you.

## 2018-12-03 NOTE — LETTER
12/3/2018       RE: Paulie Mcdonough  [address]  [facility] [address]     Dear Colleague,    Thank you for referring your patient, Paulie Mcdonough, to the [facility] EAR NOSE AND TH[facility]AT at [facility]. Please see a copy of my visit note below.    December 3, 2018    PRIOR ONCOLOGIC HISTORY:  Mr. Mcdonough is a 73 year old male who is a previous patient of Dr. 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    HISTORY OF PRESENT ILLNESS:  Mr. Mcdonough has returned for follow-up.  He is now 5 years and 4 months out from his chemoradiation treatment.  Patient is doing well he has no complaints today. He denies any hoarseness, unexpected weight loss, referred otalgia, odynophagia, dysphagia, hemoptysis, pain, bleeding, and recurrent lumps or bumps. He last saw his oncologist Dr. Mueller 8/6/2018 who went through his imaging and results at that time.    REVIEW OF SYSTEMS:   ENT ROS 2/26/2018   Constitutional -   Neurology -   Ears, Nose, Throat Nasal congestion or drainage   Cardiopulmonary -   Gastrointestinal/Genitourinary -   Musculoskeletal -   Allergy/Immunology -   Endocrine Heat or cold intolerance   Skin -      10 point ROS neg other than the symptoms noted above in the HPI.    PHYSICAL EXAMINATION:    There were no vitals taken for this visit.  Constitutional:  The patient was unaccompanied, well-groomed, and in no acute distress.     Oral Cavity: Normal tongue, floor of mouth, buccal mucosa, and palate.  No lesions or masses on inspection or palpation.     Oropharynx and Hypopharynx: Normal mucosa, palate symmetric with normal elevation. No abnormal lymph tissue in the oropharynx.  Pterygoid region non-tender. Palpation of the oropharynx, base of tongue, vallecula, and epiglottis revealed no masses or induration.   Neck: Supple with normal laryngeal and tracheal landmarks.  The  parotid beds were without masses.  No palpable thyroid.  Normal range of motion   Lymphatic: There is no palpable lymphadenopathy in the neck.      PROCEDURE:   FIBEROPTIC ENDOSCOPY:  Consent for fiberoptic laryngoscopy was obtained, and we confirmed correctness of procedure and identity of patient.  Fiberoptic laryngoscopy was indicated due to history of right base of tongue cancer.  The nose was topically decongested and anesthetized.  The fiberoptic laryngoscope was passed under endoscopic vision through the right nare.  The turbinates were normal.  The inferior and middle meati were clear bilaterally without purulence, masses, or polyps.  The nasopharynx was clear.  The Eustachian tubes were clear.  The soft palate appeared normal with good mobility.  The epiglottis was sharp and the visualized portion of the vallecula was clear.  The larynx was clear with mobile cords.  The arytenoids were clear and there was no pooling in the hypopharynx. I saw no concern lesions or signs of recurrence.    IMAGING:  CT neck:  IMPRESSION: Stable chest CT. No evidence of metastatic disease.    CT chest:  IMPRESSION:     1. Posttreatment changes in the neck again seen. No evidence of  recurrent suspicious mass or lymph node in the neck.  2. Lytic and sclerotic changes in the mandible, particularly on the  right, with periapical lucency of the remaining right mandibular  teeth. 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    ASSESSMENT AND PLAN:  Paulie Mcdonough is a 73 year old male 5 years and 4 months out from chemoradiation treatment for a T4 N0 M0 squamous cell carcinoma of the right base of tongue.  No evidence of disease at today's visit.  I informed the patient that he is now considered cured as he is 5 years out from his treatment.  He is happy to hear this news.  We did go over his CT scans again today.  I reiterated the importance of letting his dentist and oral  surgeon know of his prior radiation treatment to that area and to be very cautious of any dental extractions or procedures as he does exhibit some ORN of the mandible.  Patient states that they are aware of this and that he does have some procedures scheduled in the future, however they are approaching things conservatively knowing his mandibular bone health.  Patient to follow-up in 1-3 years.    Amanda Madera PA-C  Otolaryngology - Head & Neck Surgery  [phone]      CC:  Almas Jeff MD  Otolaryngology/Head & Neck Surgery  [facility] 396    Jovi Daija Mueller MD  Hematology & Oncology    Samuel Coffey MD   [address]   [address]       Summer Hernandez MD   [facility]

## 2019-09-28 ENCOUNTER — HEALTH MAINTENANCE LETTER (OUTPATIENT)
Age: 74
End: 2019-09-28

## 2019-10-01 ENCOUNTER — ONCOLOGY VISIT (OUTPATIENT)
Dept: ONCOLOGY | Facility: CLINIC | Age: 74
End: 2019-10-01
Payer: MEDICARE

## 2019-10-01 VITALS
TEMPERATURE: 97.6 F | SYSTOLIC BLOOD PRESSURE: 134 MMHG | DIASTOLIC BLOOD PRESSURE: 80 MMHG | RESPIRATION RATE: 18 BRPM | BODY MASS INDEX: 29.29 KG/M2 | WEIGHT: 221 LBS | HEIGHT: 73 IN | HEART RATE: 69 BPM | OXYGEN SATURATION: 97 %

## 2019-10-01 DIAGNOSIS — C02.9 SQUAMOUS CELL CARCINOMA OF TONGUE (H): Primary | ICD-10-CM

## 2019-10-01 DIAGNOSIS — E03.9 HYPOTHYROIDISM, UNSPECIFIED TYPE: ICD-10-CM

## 2019-10-01 PROCEDURE — 99204 OFFICE O/P NEW MOD 45 MIN: CPT | Performed by: INTERNAL MEDICINE

## 2019-10-01 RX ORDER — SILDENAFIL 50 MG/1
50 TABLET, FILM COATED ORAL
COMMUNITY
Start: 2019-10-01

## 2019-10-01 RX ORDER — LISINOPRIL 2.5 MG/1
TABLET ORAL
COMMUNITY
Start: 2019-09-05

## 2019-10-01 RX ORDER — DULOXETIN HYDROCHLORIDE 30 MG/1
30 CAPSULE, DELAYED RELEASE ORAL
COMMUNITY
Start: 2019-07-24 | End: 2019-10-21

## 2019-10-01 RX ORDER — DOXAZOSIN 2 MG/1
2 TABLET ORAL
COMMUNITY
Start: 2019-01-22

## 2019-10-01 ASSESSMENT — MIFFLIN-ST. JEOR: SCORE: 1806.33

## 2019-10-01 NOTE — PATIENT INSTRUCTIONS
"Today:  See ENT.  CT of chest and neck.    ENT Consult in [facility] Date/Time:  10/21/19 at 11:15    CT Scan Date/Time:   10/21/19 check in at 12:30  Please see attached for instruction details.  Nothing to eat or drink for 2 hours prior to scan.  You will check in 30 minutes early to start a \"water prep\" prior to scan instead of oral contrast.  Radiology will call you with different instructions if oral contrast needed.    Please follow up with Dr. Chun with results.      Office visit follow up with Dr. Chun Date/Time: 10/24/19 at 1:45    If you have any questions or concerns please feel free to call.    If you need to reschedule please call:  Clinic or Lab Appointment - [phone]  Infusion - [phone]  Imaging - [phone]    Artemio Gardner RN, BSN, OCN  [facility]   Oncology/Hematology Care Coordinator RN  [facility]  [phone]            "

## 2019-10-01 NOTE — LETTER
10/1/2019         RE: Paulie Mcdonough  [address]  [facility] [address]        Dear Colleague,    Thank you for referring your patient, Paulie Mcdonough, to the [facility]. Please see a copy of my visit note below.    DATE OF VISIT: Oct 1, 2019    REASON FOR REFERRAL: Squamous cell carcinoma of the tongue.    CHIEF COMPLAINT:   Chief Complaint   Patient presents with     Oncology Clinic Visit       HISTORY OF PRESENT ILLNESS:     Paulie Mcdonough is a 74-year-old male patient with history of squamous carcinoma arising from the base of the tongue's 2013.  Presented initially with sore throat, increased expectoration and blood in the sputum.  Sequently was evaluated by imaging and refused to see ENT.  Flexible fiberoptic direct pharyngoscopy revealed a large mass occupying greater than 50% of the oropharyngeal airway.  Biopsy of the right tongue base revealed poorly differentiated squamous cell carcinoma.  Subsequently, he started on chemoradiation with cisplatin with curative intent.  He has been followed by ENT and radiation therapy until his 5 years out. Over the last few months, he has been having hoarseness of voice and sore throat.  He is here today to discuss these findings.    REVIEW OF SYSTEMS:   Constitutional: Negative for fever, chills, and night sweats.  Skin: negative.  Eyes: negative.  Ears/Nose/Throat: negative.  Respiratory: No shortness of breath, dyspnea on exertion, cough, or hemoptysis.  Cardiovascular: negative.  Gastrointestinal: negative.  Genitourinary: negative.  Musculoskeletal: negative.  Neurologic: negative.  Psychiatric: negative.  Hematologic/Lymphatic/Immunologic: negative.  Endocrine: negative.    PAST MEDICAL HISTORY:   Past Medical History:   Diagnosis Date     Arthritis      Hearing problem      Squamous carcinoma     throat     Throat cancer (H)        PAST SURGICAL HISTORY:   Past Surgical History:   Procedure Laterality Date     ENT SURGERY  @ 11 y/o     tonsillectomy     INSERT PORT VASCULAR ACCESS  6/10/2013    Procedure: INSERT PORT VASCULAR ACCESS;  Power Port Placement, Percutaneous Endoscopic Gastrostomy Tube possible Open Gastrostomy Tube;  Surgeon: Jeronimo Marie MD;  Location: [facility] OR     INSERT PORT VASCULAR ACCESS  6/10/2013    Procedure: INSERT PORT VASCULAR ACCESS;  port site re-exploration  (Right);  Surgeon: Jeronimo Marie MD;  Location: [facility]     LARYNGOSCOPY, ESOPHAGOSCOPY WITH DILATION, COMBINED  6/7/2013    Procedure: COMBINED LARYNGOSCOPY, ESOPHAGOSCOPY WITH DILATION;;  Surgeon: Summer Hernandez MD;  Location: [facility] OR     REMOVE PORT VASCULAR ACCESS  1/8/2014    Procedure: REMOVE PORT VASCULAR ACCESS;  Removal of Right Vascular  Port  Access;  Surgeon: Jeronimo Marie MD;  Location: [facility] OR     TRACHEOSTOMY  6/7/2013    Procedure: TRACHEOSTOMY;  Awake Tracheostomy, Direct Laryngoscopy and Biopsies;  Surgeon: Summer Hernandez MD;  Location: [facility]       ALLERGIES:   Allergies as of 10/01/2019 - Reviewed 10/01/2019   Allergen Reaction Noted     Nka [no known allergies]  03/31/2014       MEDICATIONS:   Current Outpatient Medications   Medication Sig Dispense Refill     Cholecalciferol (D3 ADULT PO)        Cyanocobalamin (VITAMIN B 12 PO) Take 500 mcg by mouth       doxazosin (CARDURA) 2 MG tablet Take 2 mg by mouth       DOXAZOSIN MESYLATE PO Take 2 mg by mouth daily       DULoxetine (CYMBALTA) 30 MG capsule Take 30 mg by mouth       finasteride (PROSCAR) 5 MG tablet Take 5 mg by mouth Reported on 4/3/2017       levothyroxine (SYNTHROID/LEVOTHROID) 100 MCG tablet Take 1 tablet (100 mcg) by mouth daily Call clinic to schedule follow  MD APPT. / LABS (DUE AUG) 30 tablet 0     lisinopril (PRINIVIL/ZESTRIL) 2.5 MG tablet TK 1 T PO ONCE D       loratadine (CLARITIN) 10 MG tablet Take 10 mg by mouth daily Reported on 4/3/2017       melatonin 5 MG CAPS Take 10 mg by mouth       Multiple Vitamins-Minerals  "(MULTIVITAMIN ADULT PO)        OMEPRAZOLE PO Take 20 mg by mouth daily Reported on 4/3/2017       sildenafil (VIAGRA) 50 MG tablet Take 50 mg by mouth       Cyclobenzaprine HCl (FLEXERIL PO) Take 10 mg by mouth          FAMILY HISTORY:   Family History   Problem Relation Age of Onset     Cancer Mother         GYN Cancer          SOCIAL HISTORY:   Social History     Socioeconomic History     Marital status:      Spouse name: None     Number of children: None     Years of education: None     Highest education level: None   Occupational History     None   Social Needs     Financial resource strain: None     Food insecurity:     Worry: None     Inability: None     Transportation needs:     Medical: None     Non-medical: None   Tobacco Use     Smoking status: Passive Smoke Exposure - Never Smoker     Smokeless tobacco: Current User   Substance and Sexual Activity     Alcohol use: Yes     Comment: 2 beers/week     Drug use: No     Sexual activity: Yes     Partners: Female   Lifestyle     Physical activity:     Days per week: None     Minutes per session: None     Stress: None   Relationships     Social connections:     Talks on phone: None     Gets together: None     Attends Mor[employer] service: None     Active member of club or organization: None     Attends meetings of clubs or organizations: None     Relationship status: None     Intimate partner violence:     Fear of current or ex partner: None     Emotionally abused: None     Physically abused: None     Forced sexual activity: None   Other Topics Concern     Parent/sibling w/ CABG, MI or angioplasty before 65F 55M? Not Asked   Social History Narrative     None       PHYSICAL EXAMINATION:   /80   Pulse 69   Temp 97.6  F (36.4  C)   Resp 18   Ht 1.854 m (6' 1\")   Wt 100.2 kg (221 lb)   SpO2 97%   BMI 29.16 kg/m     Wt Readings from Last 10 Encounters:   10/01/19 100.2 kg (221 lb)   12/03/18 98.9 kg (218 lb)   08/06/18 104.3 kg (230 lb)   02/26/18 " 104.3 kg (230 lb)   08/21/17 107.5 kg (237 lb)   04/03/17 109.3 kg (241 lb)   02/20/17 105.2 kg (232 lb)   08/15/16 106.6 kg (235 lb)   03/14/16 107.2 kg (236 lb 4.8 oz)   02/08/16 107.4 kg (236 lb 11.2 oz)      ECOG performance status: 0  GENERAL APPEARANCE: Healthy, alert and in no acute distress.  HEENT: Sclerae anicteric. PERRLA. Oropharynx without ulcers, lesions, or thrush.  NECK: Supple. No asymmetry or masses.  LYMPHATICS: No palpable cervical, supraclavicular, axillary, or inguinal lymphadenopathy.  RESP: Lungs clear to auscultation bilaterally without rales, rhonchi or wheezes.  CARDIOVASCULAR: Regular rate and rhythm. Normal S1, S2; no S3 or S4. No murmur, gallop, or rub.  ABDOMEN: Soft, nontender. Bowel sounds normal. No palpable organomegaly or masses.  MUSCULOSKELETAL: Extremities without gross deformities noted. No edema of bilateral lower extremities.  SKIN: No suspicious lesions or rashes.  NEURO: Alert and oriented x 3. Cranial nerves II-XII grossly intact.  PSYCHIATRIC: Mentation and affect appear normal.    LABORATORY RESULTS:  Hospital Outpatient Visit on 06/29/2018   Component Date Value Ref Range Status     Creatinine 06/29/2018 0.9  0.66 - 1.25 mg/dL Final     GFR Estimate 06/29/2018 83  >60 mL/min/1.7m2 Final     GFR Estimate If Black 06/29/2018 >90  >60 mL/min/1.7m2 Final       IMAGING RESULTS:  Previous record and imaging studies were reviewed today.s.    ASSESSMENT AND PLAN:    (C02.9) Squamous cell carcinoma of tongue (H)  (primary encounter diagnosis)  74-year-old male patient with history of squamous cell carcinoma of the base of the tongue status post chemoradiation in 2013.  Patient has been disease-free.  The patient presented recently with horsiness of voice and  sore throat.  I recommend the patient to be evaluated by ENT.  I will also recommend to arrange for CT scan of the chest and soft tissue of the neck.  I will see the patient when these results are available to discuss  further management plan.    (E03.9) Hypothyroidism, unspecified type  Patient currently on levothyroxine 100 mcg orally daily.    The patient is ready to learn, no apparent learning barriers were identified, Diagnosis and treatment plans were explained to the patient. The patient expressed understanding of the content. The patient questions were answered to his satisfaction.    Catina Chun MD    Chart documentation with Dragon Voice recognition Software. Although reviewed after completion, some words and grammatical errors may remain.    Again, thank you for allowing me to participate in the care of your patient.        Sincerely,        Catina Chun MD

## 2019-10-02 NOTE — NURSING NOTE
DISCHARGE PLAN:  Next appointments: See patient instruction section  Departure Mode: Ambulatory  Accompanied by: self  10 minutes for nursing discharge (face to face time)     Paulie Mcdonough is here today for Oncology consult.  Writing nurse seen patient after Medical Oncology appointment to address questions/concerns/coordinate care. Introduced self and role.  Patient to have scans and consult with ENT.  Patient's availability for consult was 10/21.  Dr Chun aware.  Patient will have scans and labs after consult.  Follow up with Dr. Chun for results.  Appointments scheduled.  See patient instructions and Oncologist's Progress note for further details. Questions and concerns addressed to patient's satisfaction. Patient verbalized and demonstrated understanding of plan.  Contact information provided and patient is encouraged to call with any that arise in the interim of care.    Artemio Gardner, RN, BSN, OCN   Oncology Care Coordinator RN  [facility]  [phone]  10/2/2019, 12:02 PM

## 2019-10-10 NOTE — PROGRESS NOTES
DATE OF VISIT: Oct 1, 2019    REASON FOR REFERRAL: Squamous cell carcinoma of the tongue.    CHIEF COMPLAINT:   Chief Complaint   Patient presents with     Oncology Clinic Visit       HISTORY OF PRESENT ILLNESS:     Paulie Mcdonough is a 74-year-old male patient with history of squamous carcinoma arising from the base of the tongue's 2013.  Presented initially with sore throat, increased expectoration and blood in the sputum.  Sequently was evaluated by imaging and refused to see ENT.  Flexible fiberoptic direct pharyngoscopy revealed a large mass occupying greater than 50% of the oropharyngeal airway.  Biopsy of the right tongue base revealed poorly differentiated squamous cell carcinoma.  Subsequently, he started on chemoradiation with cisplatin with curative intent.  He has been followed by ENT and radiation therapy until his 5 years out. Over the last few months, he has been having hoarseness of voice and sore throat.  He is here today to discuss these findings.    REVIEW OF SYSTEMS:   Constitutional: Negative for fever, chills, and night sweats.  Skin: negative.  Eyes: negative.  Ears/Nose/Throat: negative.  Respiratory: No shortness of breath, dyspnea on exertion, cough, or hemoptysis.  Cardiovascular: negative.  Gastrointestinal: negative.  Genitourinary: negative.  Musculoskeletal: negative.  Neurologic: negative.  Psychiatric: negative.  Hematologic/Lymphatic/Immunologic: negative.  Endocrine: negative.    PAST MEDICAL HISTORY:   Past Medical History:   Diagnosis Date     Arthritis      Hearing problem      Squamous carcinoma     throat     Throat cancer (H)        PAST SURGICAL HISTORY:   Past Surgical History:   Procedure Laterality Date     ENT SURGERY  @ 9 y/o    tonsillectomy     INSERT PORT VASCULAR ACCESS  6/10/2013    Procedure: INSERT PORT VASCULAR ACCESS;  Power Port Placement, Percutaneous Endoscopic Gastrostomy Tube possible Open Gastrostomy Tube;  Surgeon: Jeronimo Marie MD;  Location:  [facility] OR     INSERT PORT VASCULAR ACCESS  6/10/2013    Procedure: INSERT PORT VASCULAR ACCESS;  port site re-exploration  (Right);  Surgeon: Jeronimo Marie MD;  Location: [facility] OR     LARYNGOSCOPY, ESOPHAGOSCOPY WITH DILATION, COMBINED  6/7/2013    Procedure: COMBINED LARYNGOSCOPY, ESOPHAGOSCOPY WITH DILATION;;  Surgeon: Summer Hernandez MD;  Location: [facility] OR     REMOVE PORT VASCULAR ACCESS  1/8/2014    Procedure: REMOVE PORT VASCULAR ACCESS;  Removal of Right Vascular  Port  Access;  Surgeon: Jeronimo Marie MD;  Location: [facility] OR     TRACHEOSTOMY  6/7/2013    Procedure: TRACHEOSTOMY;  Awake Tracheostomy, Direct Laryngoscopy and Biopsies;  Surgeon: Summer Hernandez MD;  Location: [facility] OR       ALLERGIES:   Allergies as of 10/01/2019 - Reviewed 10/01/2019   Allergen Reaction Noted     Nka [no known allergies]  03/31/2014       MEDICATIONS:   Current Outpatient Medications   Medication Sig Dispense Refill     Cholecalciferol (D3 ADULT PO)        Cyanocobalamin (VITAMIN B 12 PO) Take 500 mcg by mouth       doxazosin (CARDURA) 2 MG tablet Take 2 mg by mouth       DOXAZOSIN MESYLATE PO Take 2 mg by mouth daily       DULoxetine (CYMBALTA) 30 MG capsule Take 30 mg by mouth       finasteride (PROSCAR) 5 MG tablet Take 5 mg by mouth Reported on 4/3/2017       levothyroxine (SYNTHROID/LEVOTHROID) 100 MCG tablet Take 1 tablet (100 mcg) by mouth daily Call clinic to schedule follow  MD APPT. / LABS (DUE AUG) 30 tablet 0     lisinopril (PRINIVIL/ZESTRIL) 2.5 MG tablet TK 1 T PO ONCE D       loratadine (CLARITIN) 10 MG tablet Take 10 mg by mouth daily Reported on 4/3/2017       melatonin 5 MG CAPS Take 10 mg by mouth       Multiple Vitamins-Minerals (MULTIVITAMIN ADULT PO)        OMEPRAZOLE PO Take 20 mg by mouth daily Reported on 4/3/2017       sildenafil (VIAGRA) 50 MG tablet Take 50 mg by mouth       Cyclobenzaprine HCl (FLEXERIL PO) Take 10 mg by mouth          FAMILY HISTORY:   Family  "History   Problem Relation Age of Onset     Cancer Mother         GYN Cancer          SOCIAL HISTORY:   Social History     Socioeconomic History     Marital status:      Spouse name: None     Number of children: None     Years of education: None     Highest education level: None   Occupational History     None   Social Needs     Financial resource strain: None     Food insecurity:     Worry: None     Inability: None     Transportation needs:     Medical: None     Non-medical: None   Tobacco Use     Smoking status: Passive Smoke Exposure - Never Smoker     Smokeless tobacco: Current User   Substance and Sexual Activity     Alcohol use: Yes     Comment: 2 beers/week     Drug use: No     Sexual activity: Yes     Partners: Female   Lifestyle     Physical activity:     Days per week: None     Minutes per session: None     Stress: None   Relationships     Social connections:     Talks on phone: None     Gets together: None     Attends Jainism service: None     Active member of club or organization: None     Attends meetings of clubs or organizations: None     Relationship status: None     Intimate partner violence:     Fear of current or ex partner: None     Emotionally abused: None     Physically abused: None     Forced sexual activity: None   Other Topics Concern     Parent/sibling w/ CABG, MI or angioplasty before 65F 55M? Not Asked   Social History Narrative     None       PHYSICAL EXAMINATION:   /80   Pulse 69   Temp 97.6  F (36.4  C)   Resp 18   Ht 1.854 m (6' 1\")   Wt 100.2 kg (221 lb)   SpO2 97%   BMI 29.16 kg/m    Wt Readings from Last 10 Encounters:   10/01/19 100.2 kg (221 lb)   12/03/18 98.9 kg (218 lb)   08/06/18 104.3 kg (230 lb)   02/26/18 104.3 kg (230 lb)   08/21/17 107.5 kg (237 lb)   04/03/17 109.3 kg (241 lb)   02/20/17 105.2 kg (232 lb)   08/15/16 106.6 kg (235 lb)   03/14/16 107.2 kg (236 lb 4.8 oz)   02/08/16 107.4 kg (236 lb 11.2 oz)      ECOG performance status: 0  GENERAL " APPEARANCE: Healthy, alert and in no acute distress.  HEENT: Sclerae anicteric. PERRLA. Oropharynx without ulcers, lesions, or thrush.  NECK: Supple. No asymmetry or masses.  LYMPHATICS: No palpable cervical, supraclavicular, axillary, or inguinal lymphadenopathy.  RESP: Lungs clear to auscultation bilaterally without rales, rhonchi or wheezes.  CARDIOVASCULAR: Regular rate and rhythm. Normal S1, S2; no S3 or S4. No murmur, gallop, or rub.  ABDOMEN: Soft, nontender. Bowel sounds normal. No palpable organomegaly or masses.  MUSCULOSKELETAL: Extremities without gross deformities noted. No edema of bilateral lower extremities.  SKIN: No suspicious lesions or rashes.  NEURO: Alert and oriented x 3. Cranial nerves II-XII grossly intact.  PSYCHIATRIC: Mentation and affect appear normal.    LABORATORY RESULTS:  Hospital Outpatient Visit on 06/29/2018   Component Date Value Ref Range Status     Creatinine 06/29/2018 0.9  0.66 - 1.25 mg/dL Final     GFR Estimate 06/29/2018 83  >60 mL/min/1.7m2 Final     GFR Estimate If Black 06/29/2018 >90  >60 mL/min/1.7m2 Final       IMAGING RESULTS:  Previous record and imaging studies were reviewed today.s.    ASSESSMENT AND PLAN:    (C02.9) Squamous cell carcinoma of tongue (H)  (primary encounter diagnosis)  74-year-old male patient with history of squamous cell carcinoma of the base of the tongue status post chemoradiation in 2013.  Patient has been disease-free.  The patient presented recently with horsiness of voice and  sore throat.  I recommend the patient to be evaluated by ENT.  I will also recommend to arrange for CT scan of the chest and soft tissue of the neck.  I will see the patient when these results are available to discuss further management plan.    (E03.9) Hypothyroidism, unspecified type  Patient currently on levothyroxine 100 mcg orally daily.    The patient is ready to learn, no apparent learning barriers were identified, Diagnosis and treatment plans were explained  to the patient. The patient expressed understanding of the content. The patient questions were answered to his satisfaction.    Catina Chun MD    Chart documentation with Dragon Voice recognition Software. Although reviewed after completion, some words and grammatical errors may remain.

## 2019-10-18 NOTE — PROGRESS NOTES
ENT Consultation    Paulie Mcdonough is a 74 year old male who is seen in consultation at the request of self.          Chief Complaint - hoarseness    History of Present Illness - Paulie Mcdonough is a 74 year old male who presents with a history of hoarseness since few months ago. Around the time of onset, was also experiencing cough mostly dry but also brings up some clear phlegm without hemoptysis.  This is been ongoing for the last several months.  Has it with and without meals.  Denies any dysphagia dyspnea.  No weight loss.  He also has had lately a lot of paresthesias involving his hands and fine hand skills have been deteriorating.  This is being investigated.  His history of upper airway cancer HPV positive in 2013 apparently with he describes more of base of tongue extending into supra glottic larynx.  He had radiation chemotherapy and has been virtually disease-free since then getting annual checkup.  Patient denies any girdle appears related symptoms.  Basically his voice become a bit more raspy lower difficulty raising more volume.  He also denies any allergies any postnasal drainage that he experiences that may be generating the cough.  He has tried Flonase in the past without much relief.  Denies any true globus sensation.    Patient is not a smoker.    Past Medical History -   Past Medical History:   Diagnosis Date     Arthritis      Hearing problem      Squamous carcinoma     throat     Throat cancer (H)        Current Medications -   Current Outpatient Medications:      Cholecalciferol (D3 ADULT PO), , Disp: , Rfl:      Cyanocobalamin (VITAMIN B 12 PO), Take 500 mcg by mouth, Disp: , Rfl:      Cyclobenzaprine HCl (FLEXERIL PO), Take 10 mg by mouth, Disp: , Rfl:      doxazosin (CARDURA) 2 MG tablet, Take 2 mg by mouth, Disp: , Rfl:      DOXAZOSIN MESYLATE PO, Take 2 mg by mouth daily, Disp: , Rfl:      DULoxetine (CYMBALTA) 30 MG capsule, Take 30 mg by mouth, Disp: , Rfl:      finasteride (PROSCAR) 5 MG  tablet, Take 5 mg by mouth Reported on 4/3/2017, Disp: , Rfl:      levothyroxine (SYNTHROID/LEVOTHROID) 100 MCG tablet, Take 1 tablet (100 mcg) by mouth daily Call clinic to schedule follow  MD APPT. / LABS (DUE AUG), Disp: 30 tablet, Rfl: 0     lisinopril (PRINIVIL/ZESTRIL) 2.5 MG tablet, TK 1 T PO ONCE D, Disp: , Rfl:      loratadine (CLARITIN) 10 MG tablet, Take 10 mg by mouth daily Reported on 4/3/2017, Disp: , Rfl:      melatonin 5 MG CAPS, Take 10 mg by mouth, Disp: , Rfl:      Multiple Vitamins-Minerals (MULTIVITAMIN ADULT PO), , Disp: , Rfl:      OMEPRAZOLE PO, Take 20 mg by mouth daily Reported on 4/3/2017, Disp: , Rfl:      sildenafil (VIAGRA) 50 MG tablet, Take 50 mg by mouth, Disp: , Rfl:     Allergies -   Allergies   Allergen Reactions     Nka [No Known Allergies]        Social History -   Social History     Socioeconomic History     Marital status:      Spouse name: Not on file     Number of children: Not on file     Years of education: Not on file     Highest education level: Not on file   Occupational History     Not on file   Social Needs     Financial resource strain: Not on file     Food insecurity:     Worry: Not on file     Inability: Not on file     Transportation needs:     Medical: Not on file     Non-medical: Not on file   Tobacco Use     Smoking status: Passive Smoke Exposure - Never Smoker     Smokeless tobacco: Current User   Substance and Sexual Activity     Alcohol use: Yes     Comment: 2 beers/week     Drug use: No     Sexual activity: Yes     Partners: Female   Lifestyle     Physical activity:     Days per week: Not on file     Minutes per session: Not on file     Stress: Not on file   Relationships     Social connections:     Talks on phone: Not on file     Gets together: Not on file     Attends [employer] service: Not on file     Active member of club or organization: Not on file     Attends meetings of clubs or organizations: Not on file     Relationship status: Not on file      Intimate partner violence:     Fear of current or ex partner: Not on file     Emotionally abused: Not on file     Physically abused: Not on file     Forced sexual activity: Not on file   Other Topics Concern     Parent/sibling w/ CABG, MI or angioplasty before 65F 55M? Not Asked   Social History Narrative     Not on file       Family History -   Family History   Problem Relation Age of Onset     Cancer Mother         GYN Cancer       Review of Systems - As per HPI and PMHx, otherwise 10+ comprehensive system review is negative.    Physical Exam  There were no vitals taken for this visit.    General - The patient is nontoxic.  Alert and oriented to person and place, answers questions and cooperates with examination appropriately.     SKIN - No suspicious lesions or rashes.  Respiration - No respiratory distress.    Voice and Breathing - The patient was breathing comfortably without the use of accessory muscles. There was no wheezing, stridor, or stertor.  The patients voice was more in tone and slightly raspy but not overtly breathy.     Eyes - Extraocular movements intact.  Sclera were not icteric or injected, conjunctiva were pink and moist.    Mouth - Examination of the oral cavity showed pink, healthy oral mucosa. No lesions or ulcerations noted.  The tongue was mobile and midline, and the dentition were in good condition.      Throat - The walls of the oropharynx were smooth, pink, moist, symmetric, and had no lesions or ulcerations.  The tonsillar pillars and soft palate were symmetric.  The uvula was midline on elevation.      Nose - External contour is symmetric, no gross deflection or scars.  Nasal mucosa is pink and moist with no abnormal mucus.  The septum was midline and non-obstructive, turbinates of normal size and position.  No polyps, masses, or purulence noted on examination.    Neck -  Palpation of the occipital, submental, submandibular, internal jugular chain, and supraclavicular nodes did not  demonstrate any abnormal lymph nodes or masses. Parotids without masses. Palpation of the thyroid was soft and smooth, with no nodules or goiter appreciated.  The trachea was mobile and midline. No pain of the anterior neck.    Neurologic - CN II-XII are grossly intact. No focal neurologic deficits.     Cardiovascular - carotid pulses are 2+ bilaterally, regular rhythm    Procedure: Flexible Endoscopy  Indication: Hoarseness    Performed in clinic today:  Attempts at mirror laryngoscopy were not possible due to gag reflex.  Therefore I proceeded with a fiberoptic examination.  First I sprayed both sides of the nose with a mixture of lidocaine and neosynephrine.  I then passed the scope through the nasal cavity.  The nasal cavity was unremarkable.  The nasopharynx was mucosally covered and symmetric.  The Eustachian tube openings were unobstructed.  Going further down I had a clear view of the base of tongue which had normal appearing lingual tonsillar tissue.  The base of tongue was free of lesions, and the vallecula was open.  The epiglottis was smooth and mucosally covered.  The supraglottic larynx was then clearly visualized.  The vocal cords moved smoothly and symmetrically, they were pearly white and no lesions were seen.  The pyriform sinuses were open, and the limited view of the postcricoid region did not show any lesions.  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 I think the most likely etiology is cough related changes pushing him more into afferent vocal cord deconditioning and some degree of presbyphonia.  Cough is certainly has to be better controlled.  With hydration over-the-counter Mucinex and finally  Tessalon Perles were prescribed.  We also discussed LPR related to lifestyle lifestyle changes that may be useful.  Finally he would definitely benefit from a vocal therapy for deconditioning but also teach him to breathe better and project better that should help his cough.  Adult lifestyle changes to prevent LPR reviewed      Avoid eating and drinking within two to three hours prior to bedtime    Do not drink alcohol    Eat small meals and slowly    Limit problem foods:    o Caffeine  o Carbonated drinks  o Chocolate  o Peppermint  o Tomato  o Citrus fruits  o Fatty and fried foods      Lose weight    Quit smoking    Wear loose clothing      Dr. Rivera Mcdonald  Otolaryngology  [facility]

## 2019-10-21 ENCOUNTER — HOSPITAL ENCOUNTER (OUTPATIENT)
Dept: CT IMAGING | Facility: CLINIC | Age: 74
End: 2019-10-21
Attending: INTERNAL MEDICINE
Payer: MEDICARE

## 2019-10-21 ENCOUNTER — OFFICE VISIT (OUTPATIENT)
Dept: OTOLARYNGOLOGY | Facility: CLINIC | Age: 74
End: 2019-10-21
Payer: MEDICARE

## 2019-10-21 ENCOUNTER — HOSPITAL ENCOUNTER (OUTPATIENT)
Dept: CT IMAGING | Facility: CLINIC | Age: 74
Discharge: HOME | End: 2019-10-21
Attending: INTERNAL MEDICINE | Admitting: INTERNAL MEDICINE
Payer: MEDICARE

## 2019-10-21 VITALS
DIASTOLIC BLOOD PRESSURE: 76 MMHG | BODY MASS INDEX: 29.55 KG/M2 | SYSTOLIC BLOOD PRESSURE: 130 MMHG | WEIGHT: 223 LBS | HEIGHT: 73 IN

## 2019-10-21 DIAGNOSIS — C02.9 SQUAMOUS CELL CARCINOMA OF TONGUE (H): ICD-10-CM

## 2019-10-21 DIAGNOSIS — R49.0 DYSPHONIA: ICD-10-CM

## 2019-10-21 DIAGNOSIS — R05.9 COUGH: Primary | ICD-10-CM

## 2019-10-21 LAB
ALBUMIN SERPL-MCNC: 3.9 G/DL (ref 3.4–5)
ALP SERPL-CCNC: 87 U/L (ref 40–150)
ALT SERPL W P-5'-P-CCNC: 34 U/L (ref 0–70)
ANION GAP SERPL CALCULATED.3IONS-SCNC: 8 MMOL/L (ref 3–14)
AST SERPL W P-5'-P-CCNC: 21 U/L (ref 0–45)
BILIRUB SERPL-MCNC: 1.1 MG/DL (ref 0.2–1.3)
BUN SERPL-MCNC: 18 MG/DL (ref 7–30)
CALCIUM SERPL-MCNC: 8.8 MG/DL (ref 8.5–10.1)
CHLORIDE SERPL-SCNC: 112 MMOL/L (ref 94–109)
CO2 SERPL-SCNC: 28 MMOL/L (ref 20–32)
CREAT SERPL-MCNC: 0.91 MG/DL (ref 0.66–1.25)
GFR SERPL CREATININE-BSD FRML MDRD: 84 ML/MIN/{1.73_M2}
GLUCOSE SERPL-MCNC: 110 MG/DL (ref 70–99)
POTASSIUM SERPL-SCNC: 4.2 MMOL/L (ref 3.4–5.3)
PROT SERPL-MCNC: 7.3 G/DL (ref 6.8–8.8)
SODIUM SERPL-SCNC: 148 MMOL/L (ref 133–144)

## 2019-10-21 PROCEDURE — 71260 CT THORAX DX C+: CPT

## 2019-10-21 PROCEDURE — 25000125 ZZHC RX 250: Performed by: INTERNAL MEDICINE

## 2019-10-21 PROCEDURE — 70491 CT SOFT TISSUE NECK W/DYE: CPT

## 2019-10-21 PROCEDURE — 25000128 H RX IP 250 OP 636: Performed by: INTERNAL MEDICINE

## 2019-10-21 PROCEDURE — 80053 COMPREHEN METABOLIC PANEL: CPT | Performed by: INTERNAL MEDICINE

## 2019-10-21 PROCEDURE — 36415 COLL VENOUS BLD VENIPUNCTURE: CPT | Performed by: OTOLARYNGOLOGY

## 2019-10-21 PROCEDURE — 31575 DIAGNOSTIC LARYNGOSCOPY: CPT | Performed by: OTOLARYNGOLOGY

## 2019-10-21 PROCEDURE — 99214 OFFICE O/P EST MOD 30 MIN: CPT | Mod: 25 | Performed by: OTOLARYNGOLOGY

## 2019-10-21 RX ORDER — IOPAMIDOL 755 MG/ML
500 INJECTION, SOLUTION INTRAVASCULAR ONCE
Status: COMPLETED | OUTPATIENT
Start: 2019-10-21 | End: 2019-10-21

## 2019-10-21 RX ORDER — BENZONATATE 200 MG/1
200 CAPSULE ORAL 3 TIMES DAILY PRN
Qty: 45 CAPSULE | Refills: 1 | Status: SHIPPED | OUTPATIENT
Start: 2019-10-21 | End: 2019-11-20

## 2019-10-21 RX ADMIN — SODIUM CHLORIDE 65 ML: 9 INJECTION, SOLUTION INTRAVENOUS at 13:00

## 2019-10-21 RX ADMIN — IOPAMIDOL 100 ML: 755 INJECTION, SOLUTION INTRAVENOUS at 13:00

## 2019-10-21 ASSESSMENT — MIFFLIN-ST. JEOR: SCORE: 1815.4

## 2019-10-21 NOTE — LETTER
10/21/2019         RE: Paulie Mcdonough  [address]  [facility] [facility] [place] [facility] [address]        Dear Colleague,    Thank you for referring your patient, Paulie Mcdonough, to the [facility]. Please see a copy of my visit note below.    ENT Consultation    Paulie Mcdonough is a 74 year old male who is seen in consultation at the request of self.          Chief Complaint - hoarseness    History of Present Illness - Paulie Mcdonough is a 74 year old male who presents with a history of hoarseness since few months ago. Around the time of onset, was also experiencing cough mostly dry but also brings up some clear phlegm without hemoptysis.  This is been ongoing for the last several months.  Has it with and without meals.  Denies any dysphagia dyspnea.  No weight loss.  He also has had lately a lot of paresthesias involving his hands and fine hand skills have been deteriorating.  This is being investigated.  His history of upper airway cancer HPV positive in 2013 apparently with he describes more of base of tongue extending into supra glottic larynx.  He had radiation chemotherapy and has been virtually disease-free since then getting annual checkup.  Patient denies any girdle appears related symptoms.  Basically his voice become a bit more raspy lower difficulty raising more volume.  He also denies any allergies any postnasal drainage that he experiences that may be generating the cough.  He has tried Flonase in the past without much relief.  Denies any true globus sensation.    Patient is not a smoker.    Past Medical History -   Past Medical History:   Diagnosis Date     Arthritis      Hearing problem      Squamous carcinoma     throat     Throat cancer (H)        Current Medications -   Current Outpatient Medications:      Cholecalciferol (D3 ADULT PO), , Disp: , Rfl:      Cyanocobalamin (VITAMIN B 12 PO), Take 500 mcg by mouth, Disp: , Rfl:      Cyclobenzaprine HCl (FLEXERIL PO), Take 10 mg by mouth, Disp: , Rfl:       doxazosin (CARDURA) 2 MG tablet, Take 2 mg by mouth, Disp: , Rfl:      DOXAZOSIN MESYLATE PO, Take 2 mg by mouth daily, Disp: , Rfl:      DULoxetine (CYMBALTA) 30 MG capsule, Take 30 mg by mouth, Disp: , Rfl:      finasteride (PROSCAR) 5 MG tablet, Take 5 mg by mouth Reported on 4/3/2017, Disp: , Rfl:      levothyroxine (SYNTHROID/LEVOTHROID) 100 MCG tablet, Take 1 tablet (100 mcg) by mouth daily Call clinic to schedule follow  MD APPT. / LABS (DUE AUG), Disp: 30 tablet, Rfl: 0     lisinopril (PRINIVIL/ZESTRIL) 2.5 MG tablet, TK 1 T PO ONCE D, Disp: , Rfl:      loratadine (CLARITIN) 10 MG tablet, Take 10 mg by mouth daily Reported on 4/3/2017, Disp: , Rfl:      melatonin 5 MG CAPS, Take 10 mg by mouth, Disp: , Rfl:      Multiple Vitamins-Minerals (MULTIVITAMIN ADULT PO), , Disp: , Rfl:      OMEPRAZOLE PO, Take 20 mg by mouth daily Reported on 4/3/2017, Disp: , Rfl:      sildenafil (VIAGRA) 50 MG tablet, Take 50 mg by mouth, Disp: , Rfl:     Allergies -   Allergies   Allergen Reactions     Nka [No Known Allergies]        Social History -   Social History     Socioeconomic History     Marital status:      Spouse name: Not on file     Number of children: Not on file     Years of education: Not on file     Highest education level: Not on file   Occupational History     Not on file   Social Needs     Financial resource strain: Not on file     Food insecurity:     Worry: Not on file     Inability: Not on file     Transportation needs:     Medical: Not on file     Non-medical: Not on file   Tobacco Use     Smoking status: Passive Smoke Exposure - Never Smoker     Smokeless tobacco: Current User   Substance and Sexual Activity     Alcohol use: Yes     Comment: 2 beers/week     Drug use: No     Sexual activity: Yes     Partners: Female   Lifestyle     Physical activity:     Days per week: Not on file     Minutes per session: Not on file     Stress: Not on file   Relationships     Social connections:     Talks on  phone: Not on file     Gets together: Not on file     Attends Confucianist service: Not on file     Active member of club or organization: Not on file     Attends meetings of clubs or organizations: Not on file     Relationship status: Not on file     Intimate partner violence:     Fear of current or ex partner: Not on file     Emotionally abused: Not on file     Physically abused: Not on file     Forced sexual activity: Not on file   Other Topics Concern     Parent/sibling w/ CABG, MI or angioplasty before 65F 55M? Not Asked   Social History Narrative     Not on file       Family History -   Family History   Problem Relation Age of Onset     Cancer Mother         GYN Cancer       Review of Systems - As per HPI and PMHx, otherwise 10+ comprehensive system review is negative.    Physical Exam  There were no vitals taken for this visit.    General - The patient is nontoxic.  Alert and oriented to person and place, answers questions and cooperates with examination appropriately.     SKIN - No suspicious lesions or rashes.  Respiration - No respiratory distress.    Voice and Breathing - The patient was breathing comfortably without the use of accessory muscles. There was no wheezing, stridor, or stertor.  The patients voice was more in tone and slightly raspy but not overtly breathy.     Eyes - Extraocular movements intact.  Sclera were not icteric or injected, conjunctiva were pink and moist.    Mouth - Examination of the oral cavity showed pink, healthy oral mucosa. No lesions or ulcerations noted.  The tongue was mobile and midline, and the dentition were in good condition.      Throat - The walls of the oropharynx were smooth, pink, moist, symmetric, and had no lesions or ulcerations.  The tonsillar pillars and soft palate were symmetric.  The uvula was midline on elevation.      Nose - External contour is symmetric, no gross deflection or scars.  Nasal mucosa is pink and moist with no abnormal mucus.  The septum was  midline and non-obstructive, turbinates of normal size and position.  No polyps, masses, or purulence noted on examination.    Neck -  Palpation of the occipital, submental, submandibular, internal jugular chain, and supraclavicular nodes did not demonstrate any abnormal lymph nodes or masses. Parotids without masses. Palpation of the thyroid was soft and smooth, with no nodules or goiter appreciated.  The trachea was mobile and midline. No pain of the anterior neck.    Neurologic - CN II-XII are grossly intact. No focal neurologic deficits.     Cardiovascular - carotid pulses are 2+ bilaterally, regular rhythm    Procedure: Flexible Endoscopy  Indication: Hoarseness    Performed in clinic today:  Attempts at mirror laryngoscopy were not possible due to gag reflex.  Therefore I proceeded with a fiberoptic examination.  First I sprayed both sides of the nose with a mixture of lidocaine and neosynephrine.  I then passed the scope through the nasal cavity.  The nasal cavity was unremarkable.  The nasopharynx was mucosally covered and symmetric.  The Eustachian tube openings were unobstructed.  Going further down I had a clear view of the base of tongue which had normal appearing lingual tonsillar tissue.  The base of tongue was free of lesions, and the vallecula was open.  The epiglottis was smooth and mucosally covered.  The supraglottic larynx was then clearly visualized.  The vocal cords moved smoothly and symmetrically, they were pearly white and no lesions were seen.  The pyriform sinuses were open, and the limited view of the postcricoid region did not show any lesions.  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 I think  the most likely etiology is cough related changes pushing him more into afferent vocal cord deconditioning and some degree of presbyphonia.  Cough is certainly has to be better controlled.  With hydration over-the-counter Mucinex and finally Tessalon Perles were prescribed.  We also discussed LPR related to lifestyle lifestyle changes that may be useful.  Finally he would definitely benefit from a vocal therapy for deconditioning but also teach him to breathe better and project better that should help his cough.  Adult lifestyle changes to prevent LPR reviewed      Avoid eating and drinking within two to three hours prior to bedtime    Do not drink alcohol    Eat small meals and slowly    Limit problem foods:    o Caffeine  o Carbonated drinks  o Chocolate  o Peppermint  o Tomato  o Citrus fruits  o Fatty and fried foods      Lose weight    Quit smoking    Wear loose clothing      Dr. Rivera Mcdonald  Otolaryngology  [facility]    Again, thank you for allowing me to participate in the care of your patient.        Sincerely,        Rivera Mcdonald MD, MD

## 2019-10-23 ENCOUNTER — TELEPHONE (OUTPATIENT)
Dept: ONCOLOGY | Facility: CLINIC | Age: 74
End: 2019-10-23

## 2019-10-23 NOTE — TELEPHONE ENCOUNTER
[facility]             While confirming Paulie Mcdonough he states he can not miss work and is wondering if the results can be sent to him? Please call to advise patient. I have cancelled the follow up appointment with Dr Chun on 10/24/19 for now.   Thank you,   Merlyn DEL RIO

## 2019-10-23 NOTE — TELEPHONE ENCOUNTER
Dr. Chun,  Patient states he cannot come to clinic tomorrow for results and cannot miss more work. Patient requesting you call him with results.   Summer Khan RN on 10/23/2019 at 2:17 PM

## 2019-11-04 NOTE — TELEPHONE ENCOUNTER
Per Adiel, ok to call patient with results of no evidence of cancer recurrence.  Patient already had lab and procedure results, just wanted CT.  No other questions or concerns.  Patient will call if he feels as though he needs to see Oncology.  Patient is out 6 years from original diagnosis.    Artemio Gardner RN, BSN, OCN  11/4/2019, 1:10 PM

## 2020-03-15 ENCOUNTER — HEALTH MAINTENANCE LETTER (OUTPATIENT)
Age: 75
End: 2020-03-15

## 2021-01-09 ENCOUNTER — HEALTH MAINTENANCE LETTER (OUTPATIENT)
Age: 76
End: 2021-01-09

## 2021-05-08 ENCOUNTER — HEALTH MAINTENANCE LETTER (OUTPATIENT)
Age: 76
End: 2021-05-08

## 2021-10-23 ENCOUNTER — HEALTH MAINTENANCE LETTER (OUTPATIENT)
Age: 76
End: 2021-10-23

## 2022-06-04 ENCOUNTER — HEALTH MAINTENANCE LETTER (OUTPATIENT)
Age: 77
End: 2022-06-04

## 2022-10-09 ENCOUNTER — HEALTH MAINTENANCE LETTER (OUTPATIENT)
Age: 77
End: 2022-10-09

## 2023-06-10 ENCOUNTER — HEALTH MAINTENANCE LETTER (OUTPATIENT)
Age: 78
End: 2023-06-10